# Patient Record
Sex: MALE | Race: WHITE | Employment: FULL TIME | URBAN - METROPOLITAN AREA
[De-identification: names, ages, dates, MRNs, and addresses within clinical notes are randomized per-mention and may not be internally consistent; named-entity substitution may affect disease eponyms.]

---

## 2018-03-12 ENCOUNTER — LAB ENCOUNTER (OUTPATIENT)
Dept: LAB | Age: 28
End: 2018-03-12
Attending: FAMILY MEDICINE
Payer: COMMERCIAL

## 2018-03-12 ENCOUNTER — OFFICE VISIT (OUTPATIENT)
Dept: FAMILY MEDICINE CLINIC | Facility: CLINIC | Age: 28
End: 2018-03-12

## 2018-03-12 VITALS
WEIGHT: 246 LBS | BODY MASS INDEX: 35.22 KG/M2 | HEART RATE: 80 BPM | SYSTOLIC BLOOD PRESSURE: 118 MMHG | TEMPERATURE: 98 F | HEIGHT: 70 IN | OXYGEN SATURATION: 98 % | DIASTOLIC BLOOD PRESSURE: 86 MMHG | RESPIRATION RATE: 16 BRPM

## 2018-03-12 DIAGNOSIS — Z00.00 ANNUAL PHYSICAL EXAM: ICD-10-CM

## 2018-03-12 DIAGNOSIS — Z00.00 ANNUAL PHYSICAL EXAM: Primary | ICD-10-CM

## 2018-03-12 LAB
25-HYDROXYVITAMIN D (TOTAL): 34.5 NG/ML (ref 30–100)
ALBUMIN SERPL-MCNC: 4.2 G/DL (ref 3.5–4.8)
ALP LIVER SERPL-CCNC: 127 U/L (ref 45–117)
ALT SERPL-CCNC: 38 U/L (ref 17–63)
AST SERPL-CCNC: 25 U/L (ref 15–41)
BASOPHILS # BLD AUTO: 0.06 X10(3) UL (ref 0–0.1)
BASOPHILS NFR BLD AUTO: 0.7 %
BILIRUB SERPL-MCNC: 0.4 MG/DL (ref 0.1–2)
BUN BLD-MCNC: 14 MG/DL (ref 8–20)
CALCIUM BLD-MCNC: 9.5 MG/DL (ref 8.3–10.3)
CHLORIDE: 106 MMOL/L (ref 101–111)
CHOLEST SMN-MCNC: 170 MG/DL (ref ?–200)
CO2: 26 MMOL/L (ref 22–32)
CREAT BLD-MCNC: 1.08 MG/DL (ref 0.7–1.3)
EOSINOPHIL # BLD AUTO: 0.13 X10(3) UL (ref 0–0.3)
EOSINOPHIL NFR BLD AUTO: 1.5 %
ERYTHROCYTE [DISTWIDTH] IN BLOOD BY AUTOMATED COUNT: 12.2 % (ref 11.5–16)
GLUCOSE BLD-MCNC: 93 MG/DL (ref 70–99)
HCT VFR BLD AUTO: 47.7 % (ref 37–53)
HDLC SERPL-MCNC: 32 MG/DL (ref 45–?)
HDLC SERPL: 5.31 {RATIO} (ref ?–4.97)
HGB BLD-MCNC: 15.7 G/DL (ref 13–17)
IMMATURE GRANULOCYTE COUNT: 0.02 X10(3) UL (ref 0–1)
IMMATURE GRANULOCYTE RATIO %: 0.2 %
LDLC SERPL CALC-MCNC: 125 MG/DL (ref ?–130)
LYMPHOCYTES # BLD AUTO: 2.15 X10(3) UL (ref 0.9–4)
LYMPHOCYTES NFR BLD AUTO: 24.4 %
M PROTEIN MFR SERPL ELPH: 8.3 G/DL (ref 6.1–8.3)
MCH RBC QN AUTO: 29.8 PG (ref 27–33.2)
MCHC RBC AUTO-ENTMCNC: 32.9 G/DL (ref 31–37)
MCV RBC AUTO: 90.5 FL (ref 80–99)
MONOCYTES # BLD AUTO: 0.59 X10(3) UL (ref 0.1–1)
MONOCYTES NFR BLD AUTO: 6.7 %
NEUTROPHIL ABS PRELIM: 5.85 X10 (3) UL (ref 1.3–6.7)
NEUTROPHILS # BLD AUTO: 5.85 X10(3) UL (ref 1.3–6.7)
NEUTROPHILS NFR BLD AUTO: 66.5 %
NONHDLC SERPL-MCNC: 138 MG/DL (ref ?–130)
PLATELET # BLD AUTO: 252 10(3)UL (ref 150–450)
POTASSIUM SERPL-SCNC: 4.6 MMOL/L (ref 3.6–5.1)
RBC # BLD AUTO: 5.27 X10(6)UL (ref 4.3–5.7)
RED CELL DISTRIBUTION WIDTH-SD: 40.4 FL (ref 35.1–46.3)
SODIUM SERPL-SCNC: 139 MMOL/L (ref 136–144)
TRIGL SERPL-MCNC: 65 MG/DL (ref ?–150)
TSI SER-ACNC: 2.39 MIU/ML (ref 0.35–5.5)
VLDLC SERPL CALC-MCNC: 13 MG/DL (ref 5–40)
WBC # BLD AUTO: 8.8 X10(3) UL (ref 4–13)

## 2018-03-12 PROCEDURE — 82306 VITAMIN D 25 HYDROXY: CPT | Performed by: FAMILY MEDICINE

## 2018-03-12 PROCEDURE — 36415 COLL VENOUS BLD VENIPUNCTURE: CPT | Performed by: FAMILY MEDICINE

## 2018-03-12 PROCEDURE — 80061 LIPID PANEL: CPT | Performed by: FAMILY MEDICINE

## 2018-03-12 PROCEDURE — 99395 PREV VISIT EST AGE 18-39: CPT | Performed by: FAMILY MEDICINE

## 2018-03-12 PROCEDURE — 80050 GENERAL HEALTH PANEL: CPT | Performed by: FAMILY MEDICINE

## 2018-03-12 RX ORDER — MULTIVIT-MIN/IRON FUM/FOLIC AC 7.5 MG-4
1 TABLET ORAL DAILY
COMMUNITY

## 2018-03-12 NOTE — H&P
Trinity Nguyen is a 32year old male who presents for a complete physical exam.   HPI:   Pt complains of nothing      Current Outpatient Prescriptions:  Multiple Vitamins-Minerals (MULTI-VITAMIN/MINERALS) Oral Tab Take 1 tablet by mouth daily.  Dis GENERAL: well developed, well nourished,in no apparent distress  SKIN: no rashes,no suspicious lesions  HEENT: atraumatic, normocephalic, PERRLA, conjunctiva clear, TMs clear, posterior pharynx clear, no congestion  NECK: supple,no adenopathy,no thyromeg

## 2018-07-27 ENCOUNTER — APPOINTMENT (OUTPATIENT)
Dept: LAB | Age: 28
End: 2018-07-27
Attending: PHYSICIAN ASSISTANT
Payer: COMMERCIAL

## 2018-07-27 ENCOUNTER — OFFICE VISIT (OUTPATIENT)
Dept: FAMILY MEDICINE CLINIC | Facility: CLINIC | Age: 28
End: 2018-07-27
Payer: COMMERCIAL

## 2018-07-27 VITALS
SYSTOLIC BLOOD PRESSURE: 108 MMHG | HEIGHT: 70 IN | WEIGHT: 242 LBS | TEMPERATURE: 98 F | HEART RATE: 78 BPM | BODY MASS INDEX: 34.65 KG/M2 | RESPIRATION RATE: 16 BRPM | OXYGEN SATURATION: 98 % | DIASTOLIC BLOOD PRESSURE: 70 MMHG

## 2018-07-27 DIAGNOSIS — R04.0 EPISTAXIS, RECURRENT: Primary | ICD-10-CM

## 2018-07-27 DIAGNOSIS — R04.0 EPISTAXIS, RECURRENT: ICD-10-CM

## 2018-07-27 LAB
ANION GAP SERPL CALC-SCNC: 9 MMOL/L (ref 0–18)
APTT PPP: 34.8 SECONDS (ref 26.1–34.6)
BUN BLD-MCNC: 15 MG/DL (ref 8–20)
BUN/CREAT SERPL: 13.9 (ref 10–20)
CALCIUM BLD-MCNC: 9.2 MG/DL (ref 8.3–10.3)
CHLORIDE SERPL-SCNC: 103 MMOL/L (ref 101–111)
CO2 SERPL-SCNC: 27 MMOL/L (ref 22–32)
CREAT BLD-MCNC: 1.08 MG/DL (ref 0.7–1.3)
GLUCOSE BLD-MCNC: 81 MG/DL (ref 70–99)
INR BLD: 0.96 (ref 0.9–1.1)
OSMOLALITY SERPL CALC.SUM OF ELEC: 288 MOSM/KG (ref 275–295)
POTASSIUM SERPL-SCNC: 4 MMOL/L (ref 3.6–5.1)
PSA SERPL DL<=0.01 NG/ML-MCNC: 13.2 SECONDS (ref 12.4–14.7)
SODIUM SERPL-SCNC: 139 MMOL/L (ref 136–144)

## 2018-07-27 PROCEDURE — 85246 CLOT FACTOR VIII VW ANTIGEN: CPT | Performed by: PHYSICIAN ASSISTANT

## 2018-07-27 PROCEDURE — 80048 BASIC METABOLIC PNL TOTAL CA: CPT | Performed by: PHYSICIAN ASSISTANT

## 2018-07-27 PROCEDURE — 36415 COLL VENOUS BLD VENIPUNCTURE: CPT | Performed by: PHYSICIAN ASSISTANT

## 2018-07-27 PROCEDURE — 85730 THROMBOPLASTIN TIME PARTIAL: CPT | Performed by: PHYSICIAN ASSISTANT

## 2018-07-27 PROCEDURE — 99213 OFFICE O/P EST LOW 20 MIN: CPT | Performed by: PHYSICIAN ASSISTANT

## 2018-07-27 PROCEDURE — 85610 PROTHROMBIN TIME: CPT | Performed by: PHYSICIAN ASSISTANT

## 2018-07-27 NOTE — PROGRESS NOTES
HPI:    Patient ID: Wade Regalado is a 32year old male. HPI  Pt presents to clinic with frequent nose bleeds from the right nostril. Has had symptoms off and on for the past 1 year. Last week had 3-4. Occurs when he blows his nose or sneezes. note and vitals reviewed. ASSESSMENT/PLAN:   1. Epistaxis, recurrent  Labs ordered  Apply small amount vaseline on Qtip to inside of nostril. Do not scratch or pick at inside of nose.    ENT consult due to duration of symptoms.   - PTT, ACTIVAT

## 2018-07-30 LAB — VON WILLEBRAND FACTOR ANTIGEN: 77 %

## 2020-08-14 ENCOUNTER — OFFICE VISIT (OUTPATIENT)
Dept: FAMILY MEDICINE CLINIC | Facility: CLINIC | Age: 30
End: 2020-08-14
Payer: COMMERCIAL

## 2020-08-14 ENCOUNTER — LAB ENCOUNTER (OUTPATIENT)
Dept: LAB | Age: 30
End: 2020-08-14
Attending: FAMILY MEDICINE
Payer: COMMERCIAL

## 2020-08-14 VITALS
OXYGEN SATURATION: 99 % | RESPIRATION RATE: 18 BRPM | WEIGHT: 185 LBS | SYSTOLIC BLOOD PRESSURE: 106 MMHG | BODY MASS INDEX: 26.48 KG/M2 | DIASTOLIC BLOOD PRESSURE: 70 MMHG | HEART RATE: 64 BPM | HEIGHT: 70 IN

## 2020-08-14 DIAGNOSIS — Z00.00 ANNUAL PHYSICAL EXAM: Primary | ICD-10-CM

## 2020-08-14 DIAGNOSIS — E55.9 VITAMIN D DEFICIENCY: ICD-10-CM

## 2020-08-14 DIAGNOSIS — Z00.00 ANNUAL PHYSICAL EXAM: ICD-10-CM

## 2020-08-14 LAB
ALBUMIN SERPL-MCNC: 4.1 G/DL (ref 3.4–5)
ALBUMIN/GLOB SERPL: 1.1 {RATIO} (ref 1–2)
ALP LIVER SERPL-CCNC: 78 U/L (ref 45–117)
ALT SERPL-CCNC: 19 U/L (ref 16–61)
ANION GAP SERPL CALC-SCNC: 6 MMOL/L (ref 0–18)
AST SERPL-CCNC: 15 U/L (ref 15–37)
BASOPHILS # BLD AUTO: 0.06 X10(3) UL (ref 0–0.2)
BASOPHILS NFR BLD AUTO: 1 %
BILIRUB SERPL-MCNC: 0.5 MG/DL (ref 0.1–2)
BUN BLD-MCNC: 13 MG/DL (ref 7–18)
BUN/CREAT SERPL: 15.5 (ref 10–20)
CALCIUM BLD-MCNC: 8.8 MG/DL (ref 8.5–10.1)
CHLORIDE SERPL-SCNC: 105 MMOL/L (ref 98–112)
CHOLEST SMN-MCNC: 190 MG/DL (ref ?–200)
CO2 SERPL-SCNC: 26 MMOL/L (ref 21–32)
CREAT BLD-MCNC: 0.84 MG/DL (ref 0.7–1.3)
DEPRECATED RDW RBC AUTO: 42.6 FL (ref 35.1–46.3)
EOSINOPHIL # BLD AUTO: 0.08 X10(3) UL (ref 0–0.7)
EOSINOPHIL NFR BLD AUTO: 1.3 %
ERYTHROCYTE [DISTWIDTH] IN BLOOD BY AUTOMATED COUNT: 12.4 % (ref 11–15)
GLOBULIN PLAS-MCNC: 3.7 G/DL (ref 2.8–4.4)
GLUCOSE BLD-MCNC: 80 MG/DL (ref 70–99)
HCT VFR BLD AUTO: 45.9 % (ref 39–53)
HDLC SERPL-MCNC: 41 MG/DL (ref 40–59)
HGB BLD-MCNC: 14.8 G/DL (ref 13–17.5)
IMM GRANULOCYTES # BLD AUTO: 0.01 X10(3) UL (ref 0–1)
IMM GRANULOCYTES NFR BLD: 0.2 %
LDLC SERPL CALC-MCNC: 135 MG/DL (ref ?–100)
LYMPHOCYTES # BLD AUTO: 1.97 X10(3) UL (ref 1–4)
LYMPHOCYTES NFR BLD AUTO: 32.9 %
M PROTEIN MFR SERPL ELPH: 7.8 G/DL (ref 6.4–8.2)
MCH RBC QN AUTO: 30.4 PG (ref 26–34)
MCHC RBC AUTO-ENTMCNC: 32.2 G/DL (ref 31–37)
MCV RBC AUTO: 94.3 FL (ref 80–100)
MONOCYTES # BLD AUTO: 0.51 X10(3) UL (ref 0.1–1)
MONOCYTES NFR BLD AUTO: 8.5 %
NEUTROPHILS # BLD AUTO: 3.35 X10 (3) UL (ref 1.5–7.7)
NEUTROPHILS # BLD AUTO: 3.35 X10(3) UL (ref 1.5–7.7)
NEUTROPHILS NFR BLD AUTO: 56.1 %
NONHDLC SERPL-MCNC: 149 MG/DL (ref ?–130)
OSMOLALITY SERPL CALC.SUM OF ELEC: 283 MOSM/KG (ref 275–295)
PATIENT FASTING Y/N/NP: YES
PATIENT FASTING Y/N/NP: YES
PLATELET # BLD AUTO: 218 10(3)UL (ref 150–450)
POTASSIUM SERPL-SCNC: 4 MMOL/L (ref 3.5–5.1)
RBC # BLD AUTO: 4.87 X10(6)UL (ref 4.3–5.7)
SODIUM SERPL-SCNC: 137 MMOL/L (ref 136–145)
TRIGL SERPL-MCNC: 72 MG/DL (ref 30–149)
TSI SER-ACNC: 1.77 MIU/ML (ref 0.36–3.74)
VIT D+METAB SERPL-MCNC: 26 NG/ML (ref 30–100)
VLDLC SERPL CALC-MCNC: 14 MG/DL (ref 0–30)
WBC # BLD AUTO: 6 X10(3) UL (ref 4–11)

## 2020-08-14 PROCEDURE — 80061 LIPID PANEL: CPT | Performed by: FAMILY MEDICINE

## 2020-08-14 PROCEDURE — 3074F SYST BP LT 130 MM HG: CPT | Performed by: FAMILY MEDICINE

## 2020-08-14 PROCEDURE — 3078F DIAST BP <80 MM HG: CPT | Performed by: FAMILY MEDICINE

## 2020-08-14 PROCEDURE — 80050 GENERAL HEALTH PANEL: CPT | Performed by: FAMILY MEDICINE

## 2020-08-14 PROCEDURE — 82306 VITAMIN D 25 HYDROXY: CPT | Performed by: FAMILY MEDICINE

## 2020-08-14 PROCEDURE — 99395 PREV VISIT EST AGE 18-39: CPT | Performed by: FAMILY MEDICINE

## 2020-08-14 PROCEDURE — 36415 COLL VENOUS BLD VENIPUNCTURE: CPT | Performed by: FAMILY MEDICINE

## 2020-08-14 PROCEDURE — 3008F BODY MASS INDEX DOCD: CPT | Performed by: FAMILY MEDICINE

## 2020-08-15 NOTE — H&P
Suzzanne Leventhal is a 34year old male who presents for a complete physical exam.   HPI:   Pt complains of nothing. .       Current Outpatient Medications   Medication Sig Dispense Refill   • Multiple Vitamins-Minerals (MULTI-VITAMIN/MINERALS) Oral T suspicious lesions  HEENT: atraumatic, normocephalic, PERRLA, conjunctiva clear, TMs clear, posterior pharynx clear, no congestion  NECK: supple,no adenopathy,no thyromegaly  LUNGS: clear to auscultation, easy breathing  CV: S1S2, RRR without murmur  GI: g

## 2021-04-14 ENCOUNTER — APPOINTMENT (OUTPATIENT)
Dept: LAB | Age: 31
End: 2021-04-14

## 2021-04-17 ENCOUNTER — IMMUNIZATION (OUTPATIENT)
Dept: LAB | Age: 31
End: 2021-04-17
Attending: HOSPITALIST
Payer: OTHER GOVERNMENT

## 2021-04-17 DIAGNOSIS — Z23 NEED FOR VACCINATION: Primary | ICD-10-CM

## 2021-04-17 PROCEDURE — 0001A SARSCOV2 VAC 30MCG/0.3ML IM: CPT

## 2021-05-09 ENCOUNTER — IMMUNIZATION (OUTPATIENT)
Dept: LAB | Age: 31
End: 2021-05-09
Attending: HOSPITALIST
Payer: OTHER GOVERNMENT

## 2021-05-09 DIAGNOSIS — Z23 NEED FOR VACCINATION: Primary | ICD-10-CM

## 2021-05-09 PROCEDURE — 0002A SARSCOV2 VAC 30MCG/0.3ML IM: CPT

## 2021-12-20 ENCOUNTER — OFFICE VISIT (OUTPATIENT)
Dept: FAMILY MEDICINE CLINIC | Facility: CLINIC | Age: 31
End: 2021-12-20
Payer: COMMERCIAL

## 2021-12-20 VITALS
SYSTOLIC BLOOD PRESSURE: 118 MMHG | OXYGEN SATURATION: 99 % | BODY MASS INDEX: 27 KG/M2 | HEART RATE: 130 BPM | HEIGHT: 70 IN | DIASTOLIC BLOOD PRESSURE: 76 MMHG | TEMPERATURE: 98 F | RESPIRATION RATE: 16 BRPM

## 2021-12-20 DIAGNOSIS — J06.9 UPPER RESPIRATORY TRACT INFECTION, UNSPECIFIED TYPE: Primary | ICD-10-CM

## 2021-12-20 DIAGNOSIS — J02.9 SORE THROAT: ICD-10-CM

## 2021-12-20 PROCEDURE — 3078F DIAST BP <80 MM HG: CPT | Performed by: NURSE PRACTITIONER

## 2021-12-20 PROCEDURE — 87880 STREP A ASSAY W/OPTIC: CPT | Performed by: NURSE PRACTITIONER

## 2021-12-20 PROCEDURE — 99213 OFFICE O/P EST LOW 20 MIN: CPT | Performed by: NURSE PRACTITIONER

## 2021-12-20 PROCEDURE — 3074F SYST BP LT 130 MM HG: CPT | Performed by: NURSE PRACTITIONER

## 2021-12-21 NOTE — PROGRESS NOTES
CHIEF COMPLAINT:   Patient presents with:  Upper Respiratory Infection: x 4 days      HPI:   Amira Steven is a 32year old male who presents for upper respiratory symptoms for  4 days. Patient reports sore throat, congestion, dry cough.  Symptoms Jamaal Emery is a 32year old male who presents with upper respiratory symptoms that are consistent with    ASSESSMENT:   Upper respiratory tract infection, unspecified type  (primary encounter diagnosis)  Sore throat    PLAN: Meds as below.   Comfort care as suellen over-the-counter acetaminophen or ibuprofen for fever, muscle aching, and headache, unless another medicine was prescribed for this.  If you have chronic liver or kidney disease or ever had a stomach ulcer or gastrointestinal bleeding, talk with your health (red or black color) or mucus in diarrhea  · Feeling weak, dizzy, or like you are going to faint  · Extreme thirst  · Fever of 100.4°F (38°C) or higher, or as directed by your healthcare provider  Yakov last reviewed this educational content on 4/1/2018

## 2024-01-18 ENCOUNTER — OFFICE VISIT (OUTPATIENT)
Dept: FAMILY MEDICINE CLINIC | Facility: CLINIC | Age: 34
End: 2024-01-18
Payer: COMMERCIAL

## 2024-01-18 VITALS
OXYGEN SATURATION: 97 % | SYSTOLIC BLOOD PRESSURE: 154 MMHG | HEART RATE: 109 BPM | DIASTOLIC BLOOD PRESSURE: 100 MMHG | RESPIRATION RATE: 18 BRPM | TEMPERATURE: 98 F

## 2024-01-18 DIAGNOSIS — J02.9 SORE THROAT: Primary | ICD-10-CM

## 2024-01-18 DIAGNOSIS — J06.9 VIRAL UPPER RESPIRATORY ILLNESS: ICD-10-CM

## 2024-01-18 DIAGNOSIS — R03.0 ELEVATED BLOOD PRESSURE READING: ICD-10-CM

## 2024-01-18 LAB
CONTROL LINE PRESENT WITH A CLEAR BACKGROUND (YES/NO): YES YES/NO
KIT LOT #: NORMAL NUMERIC
STREP GRP A CUL-SCR: NEGATIVE

## 2024-01-18 PROCEDURE — 87880 STREP A ASSAY W/OPTIC: CPT | Performed by: NURSE PRACTITIONER

## 2024-01-18 PROCEDURE — 87635 SARS-COV-2 COVID-19 AMP PRB: CPT | Performed by: NURSE PRACTITIONER

## 2024-01-18 PROCEDURE — 3077F SYST BP >= 140 MM HG: CPT | Performed by: NURSE PRACTITIONER

## 2024-01-18 PROCEDURE — 3080F DIAST BP >= 90 MM HG: CPT | Performed by: NURSE PRACTITIONER

## 2024-01-18 PROCEDURE — 99213 OFFICE O/P EST LOW 20 MIN: CPT | Performed by: NURSE PRACTITIONER

## 2024-01-18 NOTE — PROGRESS NOTES
CHIEF COMPLAINT:   Sore throat        HPI:   James Cooper is a 33 year old male presents to clinic with complaint of sore throat. Patient has had for 2 days. Patient reports following associated symptoms: fatigue and cough.    Denies fever, chills, rash, nausea, headache, ear pain.  Treating symptoms with none.    Current Outpatient Medications   Medication Sig Dispense Refill    Multiple Vitamins-Minerals (MULTI-VITAMIN/MINERALS) Oral Tab Take 1 tablet by mouth daily.      Fish Oil-Cholecalciferol (FISH OIL + D3 OR) Take by mouth.        Past Medical History:   Diagnosis Date    Other general medical examination for administrative purposes       Social History:  Social History     Socioeconomic History    Marital status: Single   Tobacco Use    Smoking status: Never    Smokeless tobacco: Never   Substance and Sexual Activity    Alcohol use: Yes    Drug use: No   Other Topics Concern    Caffeine Concern Yes    Exercise Yes        REVIEW OF SYSTEMS:   GENERAL HEALTH: feels well otherwise, good appetite  SKIN: denies any unusual skin lesions or rashes  HEENT: denies ear pain, See HPI  RESPIRATORY: denies shortness of breath or wheezing  CARDIOVASCULAR: denies chest pain or palpitations   GI: denies vomiting or diarrhea  NEURO: denies dizziness or lightheadedness    EXAM:   BP (!) 154/100   Pulse 109   Temp 98.3 °F (36.8 °C)   Resp 18   SpO2 97%   GENERAL: well developed, well nourished,in no apparent distress  SKIN: no rashes,no suspicious lesions  HEAD: atraumatic, normocephalic  EYES: conjunctiva clear, EOM intact  EARS: TM's clear, non-injected, no bulging, retraction, or fluid bilaterally  NOSE: nostrils patent, no exudates, nasal mucosa pink and noninflamed  THROAT: oral mucosa pink, moist. Posterior pharynx erythematous and injected. No exudates. Tonsils 1/4.  Breath is not malodorous.  No trismus, hoarseness, muffled voice, stridor, or uvular deviation.    NECK: supple, non-tender  LUNGS:  clear to auscultation bilaterally; no wheezes, rales, or rhonchi. Breathing is non labored.  CARDIO: RRR without murmur  EXTREMITIES: no cyanosis, clubbing or edema  LYMPH: bilateral anterior cervical lymphadenopathy. No  posterior cervical or occipital lymphadenopathy.    Recent Results (from the past 24 hour(s))   Strep A Assay W/Optic    Collection Time: 01/18/24 12:30 PM   Result Value Ref Range    Strep Grp A Screen negative Negative    Control Line Present with a clear background (yes/no) yes Yes/No    Kit Lot # [id number] Numeric    Kit Expiration Date 4/22/25 Date         ASSESSMENT AND PLAN:   Assessment: 1.   Encounter Diagnoses   Name Primary?    Sore throat Yes    Viral upper respiratory illness     Elevated blood pressure reading      Rapid strep screen is negative   1. Sore throat  negative  - Strep A Assay W/Optic    2. Viral upper respiratory illness  Supportive care  - SARS-CoV-2 by PCR; Future  - SARS-CoV-2 by PCR    3. Elevated blood pressure reading  BP diary at home. F/u with PCP    Plan: Discussed that due to symptoms and negative rapid strep this is most likely viral and does not require antibiotics.   Comfort measures explained and discussed as listed in Patient Instructions  Follow up with PCP in 7-10 days if not improving, condition worsens, or fever greater than or equal to 100.4 persists for 72 hours.      See pt handout    The patient/parent indicates understanding of these issues and agrees to the plan.

## 2024-01-19 LAB — SARS-COV-2 RNA RESP QL NAA+PROBE: NOT DETECTED

## 2024-08-04 ENCOUNTER — OFFICE VISIT (OUTPATIENT)
Dept: FAMILY MEDICINE CLINIC | Facility: CLINIC | Age: 34
End: 2024-08-04
Payer: COMMERCIAL

## 2024-08-04 VITALS
RESPIRATION RATE: 18 BRPM | SYSTOLIC BLOOD PRESSURE: 152 MMHG | WEIGHT: 280 LBS | DIASTOLIC BLOOD PRESSURE: 90 MMHG | BODY MASS INDEX: 40.09 KG/M2 | HEART RATE: 104 BPM | OXYGEN SATURATION: 97 % | HEIGHT: 70 IN | TEMPERATURE: 99 F

## 2024-08-04 DIAGNOSIS — L03.115 CELLULITIS OF RIGHT LEG: Primary | ICD-10-CM

## 2024-08-04 RX ORDER — CLINDAMYCIN HYDROCHLORIDE 300 MG/1
300 CAPSULE ORAL 3 TIMES DAILY
Qty: 21 CAPSULE | Refills: 0 | Status: SHIPPED | OUTPATIENT
Start: 2024-08-04 | End: 2024-08-11

## 2024-08-04 NOTE — PROGRESS NOTES
James Cooper is a 33 year old male.    S:  Patient presents today with the following concerns:  Chief Complaint   Patient presents with    Bite Sting,Insect     Mosquito bite on right lower leg. For a week ( redness and soreness)   OTC: OZONOL ointment     Patient had bug bite on the right lower leg.  Itchy.  Then 3 days ago used Ozonol ointment and the next morning the area was more red and larger.    Denies fevers, chills, joint pains.  Feels well otherwise.    No hx of DM or HTN.      Current Outpatient Medications   Medication Sig Dispense Refill    clindamycin 300 MG Oral Cap Take 1 capsule (300 mg total) by mouth 3 (three) times daily for 7 days. 21 capsule 0    Multiple Vitamins-Minerals (MULTI-VITAMIN/MINERALS) Oral Tab Take 1 tablet by mouth daily.      Fish Oil-Cholecalciferol (FISH OIL + D3 OR) Take by mouth.       Patient Active Problem List   Diagnosis    Attention deficit disorder without mention of hyperactivity    Obesity, unspecified    Other acne     Family History   Problem Relation Age of Onset    Other (intolerance to wheat gluten [Other]) Paternal Grandmother     Other (leukemia [Other]) Maternal Grandmother     Other (lung cancer [Other]) Paternal Grandfather        REVIEW OF SYSTEMS:  GENERAL: feels well otherwise  SKIN: see above  EYES:denies vision change  LUNGS: denies shortness of breath with exertion  CARDIOVASCULAR: denies chest pain on exertion  GI: denies abdominal pain.  No N/V/D/C  : denies dysuria  MUSCULOSKELETAL: denies back pain  NEURO: denies headaches    EXAM:  /90   Pulse 104   Temp 98.7 °F (37.1 °C) (Temporal)   Resp 18   Ht 5' 10\" (1.778 m)   Wt 280 lb (127 kg)   SpO2 97%   BMI 40.18 kg/m²   Physical Exam  Constitutional:       General: He is not in acute distress.     Appearance: Normal appearance. He is not ill-appearing, toxic-appearing or diaphoretic.   HENT:      Head: Normocephalic and atraumatic.   Cardiovascular:      Pulses: Normal pulses.    Pulmonary:      Effort: Pulmonary effort is normal.   Skin:     General: Skin is warm and dry.      Comments: Right medial distal leg with 4 cm by 4 cm  square shaped area of erythema and warmth.  Tender to palpation.  Some swelling distal to this.  No streaking.  Area is not circumferential.    Border marked with skin pen.   Neurological:      Mental Status: He is alert.        ASSESSMENT AND PLAN:  James Cooper is a 33 year old male.  Encounter Diagnosis   Name Primary?    Cellulitis of right leg Yes       No results found.     No orders of the defined types were placed in this encounter.    Meds & Refills for this Visit:  Requested Prescriptions     Signed Prescriptions Disp Refills    clindamycin 300 MG Oral Cap 21 capsule 0     Sig: Take 1 capsule (300 mg total) by mouth 3 (three) times daily for 7 days.     Imaging & Consults:  None    This appears to be cellulitis vs allergic reaction to the OTC ointment. He has stopped using this.  Recommend clindamycin as above.  Cool compresses to area.  NO icing  Gentle skin care.  Monitor marked area for progressive infection.  If develops fevers, red streaks, increased swelling/redness, body aches, weakness to go to ED.    Follow up with PCP over the next few weeks regarding elevated BP.  Message sent to PCP.    Patient verbalizes understanding of plan.    No follow-ups on file.

## 2024-08-20 ENCOUNTER — OFFICE VISIT (OUTPATIENT)
Dept: FAMILY MEDICINE CLINIC | Facility: CLINIC | Age: 34
End: 2024-08-20
Payer: COMMERCIAL

## 2024-08-20 VITALS
WEIGHT: 290 LBS | HEART RATE: 90 BPM | TEMPERATURE: 98 F | DIASTOLIC BLOOD PRESSURE: 92 MMHG | BODY MASS INDEX: 41.52 KG/M2 | HEIGHT: 70 IN | OXYGEN SATURATION: 98 % | SYSTOLIC BLOOD PRESSURE: 140 MMHG | RESPIRATION RATE: 17 BRPM

## 2024-08-20 DIAGNOSIS — L03.115 CELLULITIS OF RIGHT LEG: Primary | ICD-10-CM

## 2024-08-20 PROCEDURE — 3077F SYST BP >= 140 MM HG: CPT | Performed by: FAMILY MEDICINE

## 2024-08-20 PROCEDURE — 3080F DIAST BP >= 90 MM HG: CPT | Performed by: FAMILY MEDICINE

## 2024-08-20 PROCEDURE — 3008F BODY MASS INDEX DOCD: CPT | Performed by: FAMILY MEDICINE

## 2024-08-20 PROCEDURE — 99203 OFFICE O/P NEW LOW 30 MIN: CPT | Performed by: FAMILY MEDICINE

## 2024-08-20 RX ORDER — SULFAMETHOXAZOLE/TRIMETHOPRIM 800-160 MG
1 TABLET ORAL 2 TIMES DAILY
Qty: 14 TABLET | Refills: 0 | Status: SHIPPED | OUTPATIENT
Start: 2024-08-20

## 2024-08-20 NOTE — PROGRESS NOTES
[facility] Progress Note    SUBJECTIVE: James Cooper 33 year old male is here today for   Chief Complaint   Patient presents with    Urgent Care F/u     Cellulitis of the right leg-abx finished but infection ongoing-says he does not heal quickly        James had a bug bite, put a lotion on it and got redder and seen in UC and was put on abx course for one week. Does seem like it reduced in size of the redness    PMH  Past Medical History:    Other general medical examination for administrative purposes        PSH  No past surgical history on file.     Social Hx:  No major changes.    ROS  See HPI    OBJECTIVE:  BP (!) 140/92   Pulse 90   Temp 97.8 °F (36.6 °C)   Resp 17   Ht 5' 10\" (1.778 m)   Wt 290 lb (131.5 kg)   SpO2 98%   BMI 41.61 kg/m²     Exam  Skin: still red, warm, swollen      Labs:          Meds:   Current Outpatient Medications   Medication Sig Dispense Refill    sulfamethoxazole-trimethoprim DS (BACTRIM DS) 800-160 MG Oral Tab per tablet Take 1 tablet by mouth 2 (two) times daily. 14 tablet 0    Multiple Vitamins-Minerals (MULTI-VITAMIN/MINERALS) Oral Tab Take 1 tablet by mouth daily.      Fish Oil-Cholecalciferol (FISH OIL + D3 OR) Take by mouth.           Assessment/Plan  James was seen today for urgent care f/u.    Diagnoses and all orders for this visit:    Cellulitis of right leg  -     sulfamethoxazole-trimethoprim DS (BACTRIM DS) 800-160 MG Oral Tab per tablet; Take 1 tablet by mouth 2 (two) times daily.         Will add another week of abx to follow up or send photos if resolving         Total Time spent with patient and coordinating care:  25 minutes.    Follow up: as needed, consider 1 month for BP recheck      José Pfeiffer MD

## 2024-11-20 ENCOUNTER — OFFICE VISIT (OUTPATIENT)
Dept: FAMILY MEDICINE CLINIC | Facility: CLINIC | Age: 34
End: 2024-11-20
Payer: COMMERCIAL

## 2024-11-20 VITALS
BODY MASS INDEX: 42.66 KG/M2 | OXYGEN SATURATION: 98 % | RESPIRATION RATE: 18 BRPM | HEIGHT: 70 IN | DIASTOLIC BLOOD PRESSURE: 88 MMHG | HEART RATE: 105 BPM | WEIGHT: 298 LBS | SYSTOLIC BLOOD PRESSURE: 136 MMHG

## 2024-11-20 DIAGNOSIS — Z00.00 WELLNESS EXAMINATION: Primary | ICD-10-CM

## 2024-11-20 DIAGNOSIS — T14.8XXA CHRONIC WOUND: ICD-10-CM

## 2024-11-20 DIAGNOSIS — Z13.0 SCREENING FOR DEFICIENCY ANEMIA: ICD-10-CM

## 2024-11-20 DIAGNOSIS — Z13.220 LIPID SCREENING: ICD-10-CM

## 2024-11-20 DIAGNOSIS — Z13.29 THYROID DISORDER SCREEN: ICD-10-CM

## 2024-11-20 PROCEDURE — 3008F BODY MASS INDEX DOCD: CPT | Performed by: FAMILY MEDICINE

## 2024-11-20 PROCEDURE — 3079F DIAST BP 80-89 MM HG: CPT | Performed by: FAMILY MEDICINE

## 2024-11-20 PROCEDURE — 99395 PREV VISIT EST AGE 18-39: CPT | Performed by: FAMILY MEDICINE

## 2024-11-20 PROCEDURE — 3075F SYST BP GE 130 - 139MM HG: CPT | Performed by: FAMILY MEDICINE

## 2024-11-20 NOTE — PROGRESS NOTES
HPI:   James Cooper is a 34 year old male who presents for an Annual Health Visit.     Has ongoing wound from this summer    Social:  Living on his own, condo.  Work; , taks care office issues, paperwork checking  Hobbies: plays d&d, online garret, painting miniatures.    Allergies:   No Known Allergies    CURRENT MEDICATIONS   No current outpatient medications on file.        HISTORICAL INFORMATION   Past Medical History:    Other general medical examination for administrative purposes      No past surgical history on file.   Family History   Problem Relation Age of Onset    Other (intolerance to wheat gluten [Other]) Paternal Grandmother     Other (leukemia [Other]) Maternal Grandmother     Other (lung cancer [Other]) Paternal Grandfather       SOCIAL HISTORY   Social History     Socioeconomic History    Marital status: Single   Tobacco Use    Smoking status: Never    Smokeless tobacco: Never   Substance and Sexual Activity    Alcohol use: Yes    Drug use: No   Other Topics Concern    Caffeine Concern Yes    Exercise Yes     Social History     Social History Narrative    Not on file        REVIEW OF SYSTEMS:     Constitutional: negative  Eyes: negative  ENT: negative  Respiratory: negative  Cardiovascular: negative  Gastrointestinal: negative  Integument/Breast:  see HPI  Genitourinary: negative  Heme/Lymph: negative  Musculoskeletal: negative  Neurological: negative  Psych: negative  Endocrine: negative  Allergic/Immune: negative    EXAM:   /88   Pulse 105   Resp 18   Ht 5' 10\" (1.778 m)   Wt 298 lb (135.2 kg)   SpO2 98%   BMI 42.76 kg/m²    Wt Readings from Last 6 Encounters:   11/20/24 298 lb (135.2 kg)   08/20/24 290 lb (131.5 kg)   08/04/24 280 lb (127 kg)   08/14/20 185 lb (83.9 kg)   07/27/18 242 lb (109.8 kg)   03/12/18 246 lb (111.6 kg)     Body mass index is 42.76 kg/m².    General: alert, appears stated age, and cooperative  Head: Normocephalic, without  obvious abnormality, atraumatic  Eyes: conjunctivae/corneas clear. PERRL, EOM's intact. Fundi benign.  Ears: normal TM's and external ear canals both ears  Nose: Nares normal. Septum midline. Mucosa normal. No drainage or sinus tenderness.  Throat: lips, mucosa, and tongue normal; teeth and gums normal  Neck: no adenopathy, no carotid bruit, no JVD, supple, symmetrical, trachea midline, and thyroid not enlarged, symmetric, no tenderness/mass/nodules  Heart: S1, S2 normal, no murmur, click, rub or gallop, regular rate and rhythm  Lungs: clear to auscultation bilaterally  Chest wall: no tenderness  Abdomen: soft, non-tender; bowel sounds normal; no masses,  no organomegaly  : deferred  Back: symmetric, no curvature. ROM normal. No CVA tenderness.  Extremities: extremities normal, atraumatic, no cyanosis or edema  Pulses: 2+ and symmetric  Skin: right lower extremity, open lesio nwith eschar, no drainage about 1 cm diameter  Lymph Nodes: Cervical, supraclavicular, and axillary nodes normal.  Neurologic: Grossly normal    ASSESSMENT AND PLAN:   James was seen today for physical.    Diagnoses and all orders for this visit:    Wellness examination  -     Comp Metabolic Panel (14); Future  -     CBC With Differential With Platelet; Future  -     Lipid Panel; Future  -     TSH W Reflex To Free T4; Future    Lipid screening  -     Lipid Panel; Future    Screening for deficiency anemia  -     CBC With Differential With Platelet; Future    Thyroid disorder screen  -     TSH W Reflex To Free T4; Future    Chronic wound  -     OP REFERRAL - WOUND CLINIC    Other orders  -     Fluzone trivalent vaccine, PF 0.5mL, 6mo+ (87924)      Will recommend seeing wound clinic to evaluate further as not healing from injury and infection this summer    Will get screening labs    Advised on dietary and exercise goals  There are no Patient Instructions on file for this visit.    The patient indicates understanding of these issues and  agrees to the plan.    Problem List:  Patient Active Problem List   Diagnosis    Attention deficit disorder without mention of hyperactivity    Obesity, unspecified    Other acne       José Pfeiffer MD  11/20/2024  4:06 PM

## 2024-11-26 ENCOUNTER — TELEPHONE (OUTPATIENT)
Dept: WOUND CARE | Facility: HOSPITAL | Age: 34
End: 2024-11-26

## 2024-11-26 ENCOUNTER — PATIENT MESSAGE (OUTPATIENT)
Dept: FAMILY MEDICINE CLINIC | Facility: CLINIC | Age: 34
End: 2024-11-26

## 2024-11-26 NOTE — TELEPHONE ENCOUNTER
States his wound is healed and scab over for 3 months now, but the wound left a hole the size of a finger. Adv we can't see him caused it's not an opened wound.

## 2024-11-29 ENCOUNTER — LAB ENCOUNTER (OUTPATIENT)
Dept: LAB | Age: 34
End: 2024-11-29
Attending: FAMILY MEDICINE
Payer: COMMERCIAL

## 2024-11-29 DIAGNOSIS — Z13.220 LIPID SCREENING: ICD-10-CM

## 2024-11-29 DIAGNOSIS — Z13.29 THYROID DISORDER SCREEN: ICD-10-CM

## 2024-11-29 DIAGNOSIS — Z13.0 SCREENING FOR DEFICIENCY ANEMIA: ICD-10-CM

## 2024-11-29 DIAGNOSIS — Z00.00 WELLNESS EXAMINATION: ICD-10-CM

## 2024-11-29 LAB
ALBUMIN SERPL-MCNC: 5.2 G/DL (ref 3.2–4.8)
ALBUMIN/GLOB SERPL: 1.7 {RATIO} (ref 1–2)
ALP LIVER SERPL-CCNC: 96 U/L
ALT SERPL-CCNC: 94 U/L
ANION GAP SERPL CALC-SCNC: 10 MMOL/L (ref 0–18)
AST SERPL-CCNC: 51 U/L (ref ?–34)
BASOPHILS # BLD AUTO: 0.09 X10(3) UL (ref 0–0.2)
BASOPHILS NFR BLD AUTO: 1.2 %
BILIRUB SERPL-MCNC: 0.4 MG/DL (ref 0.3–1.2)
BUN BLD-MCNC: 15 MG/DL (ref 9–23)
CALCIUM BLD-MCNC: 9.9 MG/DL (ref 8.7–10.4)
CHLORIDE SERPL-SCNC: 105 MMOL/L (ref 98–112)
CHOLEST SERPL-MCNC: 233 MG/DL (ref ?–200)
CO2 SERPL-SCNC: 25 MMOL/L (ref 21–32)
CREAT BLD-MCNC: 1.26 MG/DL
EGFRCR SERPLBLD CKD-EPI 2021: 77 ML/MIN/1.73M2 (ref 60–?)
EOSINOPHIL # BLD AUTO: 0.13 X10(3) UL (ref 0–0.7)
EOSINOPHIL NFR BLD AUTO: 1.7 %
ERYTHROCYTE [DISTWIDTH] IN BLOOD BY AUTOMATED COUNT: 12.7 %
FASTING PATIENT LIPID ANSWER: YES
FASTING STATUS PATIENT QL REPORTED: YES
GLOBULIN PLAS-MCNC: 3.1 G/DL (ref 2–3.5)
GLUCOSE BLD-MCNC: 87 MG/DL (ref 70–99)
HCT VFR BLD AUTO: 47.3 %
HDLC SERPL-MCNC: 38 MG/DL (ref 40–59)
HGB BLD-MCNC: 15.7 G/DL
IMM GRANULOCYTES # BLD AUTO: 0.02 X10(3) UL (ref 0–1)
IMM GRANULOCYTES NFR BLD: 0.3 %
LDLC SERPL CALC-MCNC: 166 MG/DL (ref ?–100)
LYMPHOCYTES # BLD AUTO: 2.67 X10(3) UL (ref 1–4)
LYMPHOCYTES NFR BLD AUTO: 34.1 %
MCH RBC QN AUTO: 30.1 PG (ref 26–34)
MCHC RBC AUTO-ENTMCNC: 33.2 G/DL (ref 31–37)
MCV RBC AUTO: 90.8 FL
MONOCYTES # BLD AUTO: 0.63 X10(3) UL (ref 0.1–1)
MONOCYTES NFR BLD AUTO: 8.1 %
NEUTROPHILS # BLD AUTO: 4.28 X10 (3) UL (ref 1.5–7.7)
NEUTROPHILS # BLD AUTO: 4.28 X10(3) UL (ref 1.5–7.7)
NEUTROPHILS NFR BLD AUTO: 54.6 %
NONHDLC SERPL-MCNC: 195 MG/DL (ref ?–130)
OSMOLALITY SERPL CALC.SUM OF ELEC: 290 MOSM/KG (ref 275–295)
PLATELET # BLD AUTO: 290 10(3)UL (ref 150–450)
POTASSIUM SERPL-SCNC: 4.9 MMOL/L (ref 3.5–5.1)
PROT SERPL-MCNC: 8.3 G/DL (ref 5.7–8.2)
RBC # BLD AUTO: 5.21 X10(6)UL
SODIUM SERPL-SCNC: 140 MMOL/L (ref 136–145)
TRIGL SERPL-MCNC: 157 MG/DL (ref 30–149)
TSI SER-ACNC: 2.55 UIU/ML (ref 0.55–4.78)
VLDLC SERPL CALC-MCNC: 31 MG/DL (ref 0–30)
WBC # BLD AUTO: 7.8 X10(3) UL (ref 4–11)

## 2024-11-29 PROCEDURE — 80050 GENERAL HEALTH PANEL: CPT | Performed by: FAMILY MEDICINE

## 2024-11-29 PROCEDURE — 80061 LIPID PANEL: CPT | Performed by: FAMILY MEDICINE

## 2024-11-29 NOTE — TELEPHONE ENCOUNTER
It is an open wound, I think his description is under selling it, it just isn't bleeding. Could go in person, but it meets appropriate criteria to be seen. I fthey won't accept it then I would recommend derm amanda Pfeiffer MD

## 2024-12-02 NOTE — TELEPHONE ENCOUNTER
I spoke with patient and he states the [facility] called him back and he has appointment scheduled 12/3/24

## 2024-12-03 ENCOUNTER — OFFICE VISIT (OUTPATIENT)
Dept: WOUND CARE | Facility: HOSPITAL | Age: 34
End: 2024-12-03
Attending: NURSE PRACTITIONER
Payer: COMMERCIAL

## 2024-12-03 VITALS
WEIGHT: 290 LBS | HEIGHT: 70 IN | HEART RATE: 87 BPM | SYSTOLIC BLOOD PRESSURE: 168 MMHG | BODY MASS INDEX: 41.52 KG/M2 | RESPIRATION RATE: 14 BRPM | TEMPERATURE: 98 F | DIASTOLIC BLOOD PRESSURE: 88 MMHG

## 2024-12-03 DIAGNOSIS — L97.812 ULCER OF RIGHT PRETIBIAL REGION WITH FAT LAYER EXPOSED (HCC): Primary | ICD-10-CM
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
## 2024-12-03 DIAGNOSIS — I87.331 VENOUS HYPERTENSION, CHRONIC, WITH ULCER AND INFLAMMATION, RIGHT (HCC): ICD-10-CM

## 2024-12-03 PROCEDURE — 99215 OFFICE O/P EST HI 40 MIN: CPT | Performed by: NURSE PRACTITIONER

## 2024-12-03 NOTE — PROGRESS NOTES
CHIEF COMPLAINT:     Chief Complaint   Patient presents with    Wound Care     Arrives for initial visit. Patient got bug bite that opened up a couple months ago. Has not closed. States he has gone through 2 courses of antibiotics due to wound getting infected.     HPI:   Information obtained from patient and chart  12-3-24 INITIAL:  patient is a 35 yo male with pmhx of adhd, htn and obesity.  In August he presented to urgent care with an area of cellulitis on his right lower leg. He states that he is certain it was a bug bite as he was outside, felt it and then the area started to swell.  At urgent care it was not clear if it was true cellulitis or a reaction to an ointment he had been using on the area.  He was treated with clindamycin followed by a week of bactrim.. when he followed up with pcp recently for his yearly physical it was noted that the eschar was still present and patient was referred to wound clinic. Noted during visits in the summer and recently patient with elevated bp-untreated at this time. We discussed monitoring at home and keeping a log and risks of consistently high bp to other organ systems.  He has been treating the area with an ointment from [facility] that his parents recommended.  Today there is not any s/s of infection. 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 He does sleep in a bed and denies any other medical hx.  No c/o pain. We discussed compression, patient is agreeable to treatment plan.      MEDICATIONS:   No current outpatient medications on file.  ALLERGIES:   Allergies[1]   REVIEW OF SYSTEMS:   This information was obtained from the patient/family and chart.    See HPI for pertinent positives, otherwise 10 pt ROS negative.    HISTORY:     Past Medical History:    Attention deficit disorder (ADD) without hyperactivity    Hypertension    Obesity    Other general medical examination for administrative purposes     History reviewed. No pertinent surgical history.    Social History     Socioeconomic History    Marital status: Single   Tobacco Use    Smoking status: Never    Smokeless tobacco: Never   Substance and Sexual Activity    Alcohol use: Yes     Comment: occasional    Drug use: No   Other Topics Concern    Caffeine Concern Yes    Exercise Yes     PHYSICAL EXAM:     Vitals:    12/03/24 0919 12/03/24 0928   BP: (!) 175/99 (!) 168/88  Comment: manually   Pulse: 87    Resp: 14    Temp: 98 °F (36.7 °C)    Weight: 290 lb (131.5 kg)    Height: 70\"       Estimated body mass index is 41.61 kg/m² as calculated from the following:    Height as of this encounter: 70\".    Weight as of this encounter: 290 lb (131.5 kg).   No results found for: \"PGLU\"    Vital signs reviewed.Appears stated age, well groomed.    Constitutional:  Bp is elevated,  wnl for patient's trend in previous medical offices. Pulse Regular and wnl for patient. Respirations easy and unlabored. Temperature wnl. obese. Appearance neat and clean. Appears in no acute distress. Well nourished and well developed.    Lower extremity:  dp/pt palpable bilaterally. Strong pulsatile signals at the dp/pt/distal hallux bilaterally.  Bilateral lower extremities free of varicosities, +edema. Capillary refill < 3 seconds. Digits are warm. toenails are wnl for color, thickness and hygeine. Skin hydration wnl. + hairgrowth on legs, feet and toes.  Musculoskeletal:  Gait and station stable   Integumentary:  refer to wound characteristics and images   Psychiatric:  Judgment and insight intact. Alert and oriented times 3. No evidence of depression, anxiety, or agitation. Calm, cooperative, and communicative.   EDEMA:   Calf  Point of Measurement - Left Calf: 37  Point of Measurement - Right Calf: 37  Left Calf from:: Heel  Calf Left cm:: 47  Right Calf from:: Heel  Right Calf cm:: 47.5  Ankle  Point of Measurement - Left Ankle: 13  Point of Measurement - Right Ankle: 13  Left Ankle from:: Heel  Left Ankle cm:: 26.2     Right Ankle  from:: Heel  Right Ankle cm:: 28.2     DIAGNOSTICS:     Lab Results   Component Value Date    BUN 15 11/29/2024    CREATSERUM 1.26 11/29/2024    ALB 5.2 (H) 11/29/2024    TP 8.3 (H) 11/29/2024       WOUND ASSESSMENT:     Wound 12/03/24 #1 Leg Right;Medial (Active)   Date First Assessed/Time First Assessed: 12/03/24 0916    Wound Number (Wound Clinic Only): #1  Primary Wound Type: Venous Ulcer  Location: Leg  Wound Location Orientation: Right;Medial      Assessments 12/3/2024  9:18 AM   Wound Image      Drainage Amount Unable to assess   Wound Length (cm) 0.6 cm   Wound Width (cm) 0.6 cm   Wound Surface Area (cm^2) 0.36 cm^2   Wound Depth (cm) 0.1 cm   Wound Volume (cm^3) 0.036 cm^3   Margins Well-defined edges   Leigh Ann-wound Assessment Blanchable erythema;Edema   Wound Granulation Tissue Red;Firm   Wound Bed Granulation (%) 100 %       No associated orders.              ASSESSMENT AND PLAN:    1. Ulcer of right pretibial region with fat layer exposed (HCC)    2. Venous hypertension, chronic, with ulcer and inflammation, right (HCC)    3. Bug bite, sequela          Discussed with patient the aspects of wound healing including:  blood flow in/out (arterial vs venous) and managing edema with appropriate compression, wound bed optimization including moist wound healing, removal of necrosis, bioburden control, monitoring for infection,and finally the patient as a whole including nutrition and increased protein intake and blood pressure management      Risks, benefits, and alternatives of current treatment plan discussed in detail.  Questions and concerns addressed. Red flags to RTC or ED reviewed.  Patient (or parent) agrees to plan.      NOTE TO PATIENT: The 21st Century Cures Act makes clinical notes like these available to patients in the interest of transparency. Clinical notes are medical documents used by physicians and care providers to communicate with each other. These documents include medical language and  terminology, abbreviations, and treatment information that may sound technical and at times possibly unfamiliar. In addition, at times, the verbiage may appear blunt or direct. These documents are one tool providers use to communicate relevant information and clinical opinions of the care providers in a way that allows common understanding of the clinical context.   Patient is new to clinic, pcp is paola.  I spent   40   minutes with the patient. This time included:    preparing to see the patient (eg, review notes and recent diagnostics),  seeing the patient, obtaining and/or reviewing separately obtained history, performing a medically appropriate examination and/or evaluation, counseling and educating the patient, documenting in the record   DISCHARGE:      Patient Instructions   Please return: Thursday or Friday for RN visit for wound assessment, edema management, and if applicable reapplication of multilayer compression bandage system.    1 week with Vilma    Patient discharge and wound care instructions  James Cooper  12/3/2024         You may shower with protection of the wound (ie a cast cover or similar).     Cleansing/dressing:     In clinic/, with each dressing change:    please cleanse the limb, foot, and between toes with soap, water and washcloth.   Dry thoroughly.    Cleanse/soak the wound with VASHE (or other hypochlorous wound cleanser), dab dry.    Apply the following dressings:     betamethasone to periwound  Charline>xeroform>abd pad    Compression:    30-40mmhg calamine + spandagrip from ankle to knee  Managing your edema:  Avoid prolonged standing in one place. It is better to have your calf muscles moving to pump fluid out of the legs      Elevate leg(s) above the level of the heart when sitting or as much as possible.  Take you diuretics as directed by your physician. Do not skip doses or change doses unless instructed to do so by your physician.  Decrease salt intake, follow  recommended 2 grams daily.  Do not get leg(s) with compression wrap wet. If wraps are too tight as indicated by pain, numbness/tingling or discoloration of toes remove wrap completely and call the wound center @ [phone].  Refer to the \"Multi-layer compression bandage application patient information sheet\" given to you in clinic.    What Are Compression Wraps?   Compression wraps are elastic bandages that wrap around a part of your body to keep swelling down. The wraps create pressure which pushes extra fluid out of a certain area. This improves blood flow to that part of the body and helps it heal faster.   Compression wraps come in different styles. Your doctor will recommend the best compression wrap for your needs.  How Do I Take Care of a Compression Wrap?   Compression wraps can be worn for up to seven days if you take good care of them. Here's how to make them last and keep them working right:   Keep them clean and dry until your next doctor's appointment.   Wear thin, stretchable stockings over the wrap to hold it in place. This keeps the wrap from sticking to your sheets while sleeping. It also keeps your clothing from sticking to the wrap.   Wear shoes that can fit comfortably around a wrap that covers your leg and foot.     Nutrition and blood sugar control:  Focus on the following:  Protein: Meats, beans, eggs, milk and yogurt particularly Greek yogurt), tofu, soy nuts, soy protein products (Follow the protein handout in your welcome folder)  Vitamin C: Citrus fruits and juices, strawberries, tomatoes, tomato juice, peppers, baked potatoes, spinach, broccoli, cauliflower, [place] sprouts, cabbage  Vitamin A: Dark green, leafy vegetables, orange or yellow vegetables, cantaloupe, fortified dairy products, liver, fortified cereals  Zinc: Fortified cereals, red meats, seafood  Consider supplementing with Efrain by [employer]. It can be purchased on amazon, [employer] website, or local pharmacy may be able  to order it for you.  (These are essential branch chain amino acids that help with tissue building and wound healing).     Concerns:  Signs of infection may include the following:  Increase in redness  Red \"streaks\" from wound  Increase in swelling  Fever  Unusual odor  Change in the amount of wound drainage     Should you experience any significant changes in your wound(s) or have any questions regarding your home care instructions please contact the [facility] @ [phone] If after regular business hours, please call your family doctor or local emergency room. The treatment plan has been discussed at length between you and your provider. Follow all instructions carefully, it is very important. If you do not follow all instructions you are at risk of your wound not healing, infection, possible loss of limb and even loss of life.       Vilma Means FNP-C, CWCN-AP, CFCN, CSWS, WCC, DWC  12/3/2024            [1] No Known Allergies

## 2024-12-03 NOTE — PROGRESS NOTES
.Weekly Wound Education Note    Teaching Provided To: Patient  Training Topics: Dressing;Edema control;Cleasing and general instructions;Compression;Discharge instructions  Training Method: Explain/Verbal;Written  Training Response: Patient responds and understands        Notes: Inital visit for right leg wound. Start janelle, folded xeroform, ABD pad, and calamine unna boot 30-40mmHg with spandagrip E from ankle to knee. Clobetasol applied to periwound. Pt is to get blood pressure cuff and start a log for his blood pressure. Nurse visit later this week.

## 2024-12-03 NOTE — PATIENT INSTRUCTIONS
Please return: Thursday or Friday for RN visit for wound assessment, edema management, and if applicable reapplication of multilayer compression bandage system.    1 week with Vilma    Patient discharge and wound care instructions  James Cooper  12/3/2024         You may shower with protection of the wound (ie a cast cover or similar).     Cleansing/dressing:     In clinic/, with each dressing change:    please cleanse the limb, foot, and between toes with soap, water and washcloth.   Dry thoroughly.    Cleanse/soak the wound with VASHE (or other hypochlorous wound cleanser), dab dry.    Apply the following dressings:     betamethasone to periwound  Charline>xeroform>abd pad    Compression:    30-40mmhg calamine + spandagrip from ankle to knee  Managing your edema:  Avoid prolonged standing in one place. It is better to have your calf muscles moving to pump fluid out of the legs      Elevate leg(s) above the level of the heart when sitting or as much as possible.  Take you diuretics as directed by your physician. Do not skip doses or change doses unless instructed to do so by your physician.  Decrease salt intake, follow recommended 2 grams daily.  Do not get leg(s) with compression wrap wet. If wraps are too tight as indicated by pain, numbness/tingling or discoloration of toes remove wrap completely and call the wound center @ [phone].  Refer to the \"Multi-layer compression bandage application patient information sheet\" given to you in clinic.    What Are Compression Wraps?   Compression wraps are elastic bandages that wrap around a part of your body to keep swelling down. The wraps create pressure which pushes extra fluid out of a certain area. This improves blood flow to that part of the body and helps it heal faster.   Compression wraps come in different styles. Your doctor will recommend the best compression wrap for your needs.  How Do I Take Care of a Compression Wrap?   Compression wraps can  be worn for up to seven days if you take good care of them. Here's how to make them last and keep them working right:   Keep them clean and dry until your next doctor's appointment.   Wear thin, stretchable stockings over the wrap to hold it in place. This keeps the wrap from sticking to your sheets while sleeping. It also keeps your clothing from sticking to the wrap.   Wear shoes that can fit comfortably around a wrap that covers your leg and foot.     Nutrition and blood sugar control:  Focus on the following:  Protein: Meats, beans, eggs, milk and yogurt particularly Greek yogurt), tofu, soy nuts, soy protein products (Follow the protein handout in your welcome folder)  Vitamin C: Citrus fruits and juices, strawberries, tomatoes, tomato juice, peppers, baked potatoes, spinach, broccoli, cauliflower, [place] sprouts, cabbage  Vitamin A: Dark green, leafy vegetables, orange or yellow vegetables, cantaloupe, fortified dairy products, liver, fortified cereals  Zinc: Fortified cereals, red meats, seafood  Consider supplementing with Efrain by [employer]. It can be purchased on amazon, [employer] website, or local pharmacy may be able to order it for you.  (These are essential branch chain amino acids that help with tissue building and wound healing).     Concerns:  Signs of infection may include the following:  Increase in redness  Red \"streaks\" from wound  Increase in swelling  Fever  Unusual odor  Change in the amount of wound drainage     Should you experience any significant changes in your wound(s) or have any questions regarding your home care instructions please contact the [facility] @ [phone] If after regular business hours, please call your family doctor or local emergency room. The treatment plan has been discussed at length between you and your provider. Follow all instructions carefully, it is very important. If you do not follow all instructions you are at risk of your wound not  healing, infection, possible loss of limb and even loss of life.

## 2024-12-05 ENCOUNTER — OFFICE VISIT (OUTPATIENT)
Dept: WOUND CARE | Facility: HOSPITAL | Age: 34
End: 2024-12-05
Attending: NURSE PRACTITIONER
Payer: COMMERCIAL

## 2024-12-05 VITALS
DIASTOLIC BLOOD PRESSURE: 100 MMHG | TEMPERATURE: 98 F | RESPIRATION RATE: 16 BRPM | HEART RATE: 87 BPM | SYSTOLIC BLOOD PRESSURE: 160 MMHG

## 2024-12-05 DIAGNOSIS — I87.331 VENOUS HYPERTENSION, CHRONIC, WITH ULCER AND INFLAMMATION, RIGHT (HCC): Primary | ICD-10-CM

## 2024-12-05 PROCEDURE — 29581 APPL MULTLAYER CMPRN SYS LEG: CPT

## 2024-12-05 NOTE — PROGRESS NOTES
Chief Complaint   Patient presents with    Wound Care     Patient is here for a RN visit. He denies any pain or new wound concerns.         No current outpatient medications on file.    Allergies[1]       HISTORY:     Past medical, surgical, family and social history updated where appropriate.    PHYSICAL EXAM:   BP (!) 160/100   Pulse 87   Temp 97.7 °F (36.5 °C)   Resp 16        Vital signs reviewed.      Calf     Point of Measurement - Right Calf: 37        Right Calf from:: Heel  Right Calf cm:: 46    Ankle     Point of Measurement - Right Ankle: 13           Right Ankle from:: Heel  Right Ankle cm:: 25.7       Wound 12/03/24 #1 Leg Right;Medial (Active)   Date First Assessed/Time First Assessed: 12/03/24 0916    Wound Number (Wound Clinic Only): #1  Primary Wound Type: Venous Ulcer  Location: Leg  Wound Location Orientation: Right;Medial      Assessments 12/3/2024  9:18 AM 12/5/2024  4:17 PM   Wound Image        Drainage Amount Unable to assess Scant   Drainage Description -- Serous;Yellow   Treatments Compression Compression   Wound Length (cm) 0.6 cm 0.8 cm   Wound Width (cm) 0.6 cm 0.5 cm   Wound Surface Area (cm^2) 0.36 cm^2 0.4 cm^2   Wound Depth (cm) 0.1 cm 0.2 cm   Wound Volume (cm^3) 0.036 cm^3 0.08 cm^3   Wound Healing % -- -122   Margins Well-defined edges Well-defined edges   Non-staged Wound Description -- Full thickness   Leigh Ann-wound Assessment Blanchable erythema;Edema Edema   Wound Granulation Tissue Red;Firm Pink;Firm   Wound Bed Granulation (%) 100 % 100 %   Wound Odor -- None       No associated orders.       Compression Wrap 12/03/24 Leg Anterior;Right (Active)   Placement Date/Time: 12/03/24 1216   Location: Leg  Wound Location Orientation: Anterior;Right      Assessments 12/3/2024  9:18 AM 12/5/2024  4:17 PM   Response to Treatment Well tolerated Well tolerated   Compression Layers Multilayer Multilayer   Compression Product Type Unna Boot;Tubigrip/Spanda Unna Boot;Tubigrip/Spanda    Dressing Applied Yes Yes   Compression Wrap Location Toes to Knee Toes to Knee   Compression Wrap Status Clean;Dry;Intact Clean;Dry;Intact       No associated orders.          ASSESSMENT AND PLAN:        Risks, benefits, and alternatives of current treatment plan discussed in detail.  Questions and concerns addressed. Red flags to RTC or ED reviewed.  Patient (or parent) agrees to plan.      No follow-ups on file.  Weekly Wound Education Note    Teaching Provided To: Patient  Training Topics: Discharge instructions;Dressing;Cleasing and general instructions;Compression;Edema control  Training Method: Demonstration;Explain/Verbal  Training Response: Reinforcement needed        Notes: Patient here for nurse visit to right medial lower leg. Patient denies any concerns or issues. Edema measurement decreased, wound measurement increased. Continue with janelle to wound bed coverd with xeroform, (no betamethasone- pt states he did not have any itching), ABD pad. Wrappd RLE in calamine unna boot 30-40mmHg and spandagrip (E) from ankle to below the knee. Pt to follow up with provider on 12/10/24. Pt states he did place an order for BP machine- states it should arrive Monday. Pt reports having alot of anxiety- denies any issues-no headache, no blurred visiion, pt reports feeling fine.               Armin WILSON RN   12/5/2024  4:26 PM            [1] No Known Allergies

## 2024-12-09 NOTE — PROGRESS NOTES
CHIEF COMPLAINT:     No chief complaint on file.    HPI:   Information obtained from patient and chart  12-3-24 INITIAL:  patient is a 33 yo male with pmhx of adhd, htn and obesity.  In August he presented to urgent care with an area of cellulitis on his right lower leg. He states that he is certain it was a bug bite as he was outside, felt it and then the area started to swell.  At urgent care it was not clear if it was true cellulitis or a reaction to an ointment he had been using on the area.  He was treated with clindamycin followed by a week of bactrim.. when he followed up with pcp recently for his yearly physical it was noted that the eschar was still present and patient was referred to wound clinic. Noted during visits in the summer and recently patient with elevated bp-untreated at this time. We discussed monitoring at home and keeping a log and risks of consistently high bp to other organ systems.  He has been treating the area with an ointment from [facility] that his parents recommended.  Today there is not any s/s of infection. 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 He does sleep in a bed and denies any other medical hx.  No c/o pain. We discussed compression, patient is agreeable to treatment plan.    12-10-24 patient returns. He did get a home bp machine ***. Will start monitoring. He is tolerating the compression.  Wound is ***    MEDICATIONS:   No current outpatient medications on file.  ALLERGIES:   Allergies[1]   REVIEW OF SYSTEMS:   This information was obtained from the patient/family and chart.    See HPI for pertinent positives, otherwise 10 pt ROS negative.  HISTORY:   Past medical, surgical, family and social history updated where appropriate.      PHYSICAL EXAM:     There were no vitals filed for this visit.     Estimated body mass index is 41.61 kg/m² as calculated from the following:    Height as of 12/3/24: 70\".    Weight as of 12/3/24: 290 lb (131.5 kg).   No results found  for: \"PGLU\"    Vital signs reviewed.Appears stated age, well groomed.    Constitutional:  Bp ***is elevated,  ***. Pulse Regular and wnl for patient. Respirations easy and unlabored. Temperature wnl. obese. Appearance neat and clean. Appears in no acute distress. Well nourished and well developed.    Lower extremity:  dp/pt palpable ***right.  right lower extremities free of varicosities, +edema***. Capillary refill < 3 seconds. Digits are warm. toenails are wnl for color, thickness and hygeine. Skin hydration wnl. + hairgrowth on legs, feet and toes.  Musculoskeletal:  Gait and station stable   Integumentary:  refer to wound characteristics and images   Psychiatric:  Judgment and insight intact. Alert and oriented times 3. No evidence of depression, anxiety, or agitation. Calm, cooperative, and communicative.   EDEMA:   Calf                    Ankle                          DIAGNOSTICS:     Lab Results   Component Value Date    BUN 15 11/29/2024    CREATSERUM 1.26 11/29/2024    ALB 5.2 (H) 11/29/2024    TP 8.3 (H) 11/29/2024       WOUND ASSESSMENT:     Wound 12/03/24 #1 Leg Right;Medial (Active)   Date First Assessed/Time First Assessed: 12/03/24 0916    Wound Number (Wound Clinic Only): #1  Primary Wound Type: Venous Ulcer  Location: Leg  Wound Location Orientation: Right;Medial      Assessments 12/3/2024  9:18 AM 12/5/2024  4:17 PM   Wound Image        Drainage Amount Unable to assess Scant   Drainage Description -- Serous;Yellow   Treatments Compression (Unna boot 30-40mmHg, spandagrip E) Compression (calamine unna boot 30-40mmHg &spandagrip (E))   Wound Length (cm) 0.6 cm 0.8 cm   Wound Width (cm) 0.6 cm 0.5 cm   Wound Surface Area (cm^2) 0.36 cm^2 0.4 cm^2   Wound Depth (cm) 0.1 cm 0.2 cm   Wound Volume (cm^3) 0.036 cm^3 0.08 cm^3   Wound Healing % -- -122   Margins Well-defined edges Well-defined edges   Non-staged Wound Description -- Full thickness   Leigh Ann-wound Assessment Blanchable erythema;Edema Edema    Wound Granulation Tissue Red;Firm Pink;Firm   Wound Bed Granulation (%) 100 % 100 %   Wound Odor -- None       No associated orders.       Compression Wrap 12/03/24 Leg Anterior;Right (Active)   Placement Date/Time: 12/03/24 1216   Location: Leg  Wound Location Orientation: Anterior;Right      Assessments 12/3/2024  9:18 AM 12/5/2024  4:17 PM   Response to Treatment Well tolerated Well tolerated   Compression Layers Multilayer Multilayer   Compression Product Type Unna Boot;Tubigrip/Spanda (Unna boot 30-40mmHg) Unna Boot;Tubigrip/Spanda (calamine unna boot 30-40mmHg)   Dressing Applied Yes (Charline, xeroform) Yes (charline,xeroform,ABD pad)   Compression Wrap Location Toes to Knee Toes to Knee   Compression Wrap Status Clean;Dry;Intact Clean;Dry;Intact       No associated orders.              ASSESSMENT AND PLAN:    There are no diagnoses linked to this encounter.        Risks, benefits, and alternatives of current treatment plan discussed in detail.  Questions and concerns addressed. Red flags to RTC or ED reviewed.  Patient (or parent) agrees to plan.      NOTE TO PATIENT: The 21st Century Cures Act makes clinical notes like these available to patients in the interest of transparency. Clinical notes are medical documents used by physicians and care providers to communicate with each other. These documents include medical language and terminology, abbreviations, and treatment information that may sound technical and at times possibly unfamiliar. In addition, at times, the verbiage may appear blunt or direct. These documents are one tool providers use to communicate relevant information and clinical opinions of the care providers in a way that allows common understanding of the clinical context.    I spent *** minutes with the patient. This time included:    preparing to see the patient (eg, review notes and recent diagnostics),  seeing the patient, obtaining and/or reviewing separately obtained history, performing a  medically appropriate examination and/or evaluation, counseling and educating the patient, documenting in the record   DISCHARGE:      There are no Patient Instructions on file for this visit.   Vilma Means FNP-C, CWCN-AP, CFCN, CSWS, WCC, DWC  12/10/2024            [1] No Known Allergies     your physician. Do not skip doses or change doses unless instructed to do so by your physician.  Decrease salt intake, follow recommended 2 grams daily.  Do not get leg(s) with compression wrap wet. If wraps are too tight as indicated by pain, numbness/tingling or discoloration of toes remove wrap completely and call the wound center @ [phone].  Refer to the \"Multi-layer compression bandage application patient information sheet\" given to you in clinic.    What Are Compression Wraps?   Compression wraps are elastic bandages that wrap around a part of your body to keep swelling down. The wraps create pressure which pushes extra fluid out of a certain area. This improves blood flow to that part of the body and helps it heal faster.   Compression wraps come in different styles. Your doctor will recommend the best compression wrap for your needs.  How Do I Take Care of a Compression Wrap?   Compression wraps can be worn for up to seven days if you take good care of them. Here's how to make them last and keep them working right:   Keep them clean and dry until your next doctor's appointment.   Wear thin, stretchable stockings over the wrap to hold it in place. This keeps the wrap from sticking to your sheets while sleeping. It also keeps your clothing from sticking to the wrap.   Wear shoes that can fit comfortably around a wrap that covers your leg and foot.     Nutrition and blood sugar control:  Focus on the following:  Protein: Meats, beans, eggs, milk and yogurt particularly Greek yogurt), tofu, soy nuts, soy protein products (Follow the protein handout in your welcome folder)  Vitamin C: Citrus fruits and juices, strawberries, tomatoes, tomato juice, peppers, baked potatoes, spinach, broccoli, cauliflower, [place] sprouts, cabbage  Vitamin A: Dark green, leafy vegetables, orange or yellow vegetables, cantaloupe, fortified dairy products, liver, fortified cereals  Zinc: Fortified cereals, red meats,  seafood  Consider supplementing with Efrain by [employer]. It can be purchased on amazon, [employer] website, or local pharmacy may be able to order it for you.  (These are essential branch chain amino acids that help with tissue building and wound healing).     Concerns:  Signs of infection may include the following:  Increase in redness  Red \"streaks\" from wound  Increase in swelling  Fever  Unusual odor  Change in the amount of wound drainage     Should you experience any significant changes in your wound(s) or have any questions regarding your home care instructions please contact the [facility] @ [phone] If after regular business hours, please call your family doctor or local emergency room. The treatment plan has been discussed at length between you and your provider. Follow all instructions carefully, it is very important. If you do not follow all instructions you are at risk of your wound not healing, infection, possible loss of limb and even loss of life.       Vilma Means FNP-C, CWCN-AP, CFCN, CSWS, WCC, DWC  12/10/2024            [1] No Known Allergies

## 2024-12-10 ENCOUNTER — APPOINTMENT (OUTPATIENT)
Dept: WOUND CARE | Facility: HOSPITAL | Age: 34
End: 2024-12-10
Attending: NURSE PRACTITIONER
Payer: COMMERCIAL

## 2024-12-10 VITALS
SYSTOLIC BLOOD PRESSURE: 158 MMHG | RESPIRATION RATE: 14 BRPM | DIASTOLIC BLOOD PRESSURE: 87 MMHG | TEMPERATURE: 98 F | HEART RATE: 95 BPM

## 2024-12-10 DIAGNOSIS — L97.812 ULCER OF RIGHT PRETIBIAL REGION WITH FAT LAYER EXPOSED (HCC): ICD-10-CM

## 2024-12-10 DIAGNOSIS — I87.331 VENOUS HYPERTENSION, CHRONIC, WITH ULCER AND INFLAMMATION, RIGHT (HCC): Primary | ICD-10-CM

## 2024-12-10 DIAGNOSIS — I10 PRIMARY HYPERTENSION: ICD-10-CM

## 2024-12-10 PROCEDURE — 99214 OFFICE O/P EST MOD 30 MIN: CPT | Performed by: NURSE PRACTITIONER

## 2024-12-10 NOTE — PATIENT INSTRUCTIONS
Please return:   1 week with Vilma    Make an appointment with dr. Kumar for your blood pressure, bring your log on your phone    We will order compression stockings, bring them to next appointment    Patient discharge and wound care instructions  James Cooper  12/10/2024       You may shower with protection of the wound (ie a cast cover or similar).     Cleansing/dressing:     In clinic/, with each dressing change:    please cleanse the limb, foot, and between toes with soap, water and washcloth.   Dry thoroughly.    Cleanse/soak the wound with VASHE (or other hypochlorous wound cleanser), dab dry.    Apply the following dressings:     betamethasone to periwound  Charline>xeroform>abd pad    Compression:    30-40mmhg calamine     Managing your edema:  Avoid prolonged standing in one place. It is better to have your calf muscles moving to pump fluid out of the legs      Elevate leg(s) above the level of the heart when sitting or as much as possible.  Take you diuretics as directed by your physician. Do not skip doses or change doses unless instructed to do so by your physician.  Decrease salt intake, follow recommended 2 grams daily.  Do not get leg(s) with compression wrap wet. If wraps are too tight as indicated by pain, numbness/tingling or discoloration of toes remove wrap completely and call the wound center @ [phone].  Refer to the \"Multi-layer compression bandage application patient information sheet\" given to you in clinic.    What Are Compression Wraps?   Compression wraps are elastic bandages that wrap around a part of your body to keep swelling down. The wraps create pressure which pushes extra fluid out of a certain area. This improves blood flow to that part of the body and helps it heal faster.   Compression wraps come in different styles. Your doctor will recommend the best compression wrap for your needs.  How Do I Take Care of a Compression Wrap?   Compression wraps can be worn for up  to seven days if you take good care of them. Here's how to make them last and keep them working right:   Keep them clean and dry until your next doctor's appointment.   Wear thin, stretchable stockings over the wrap to hold it in place. This keeps the wrap from sticking to your sheets while sleeping. It also keeps your clothing from sticking to the wrap.   Wear shoes that can fit comfortably around a wrap that covers your leg and foot.     Nutrition and blood sugar control:  Focus on the following:  Protein: Meats, beans, eggs, milk and yogurt particularly Greek yogurt), tofu, soy nuts, soy protein products (Follow the protein handout in your welcome folder)  Vitamin C: Citrus fruits and juices, strawberries, tomatoes, tomato juice, peppers, baked potatoes, spinach, broccoli, cauliflower, [place] sprouts, cabbage  Vitamin A: Dark green, leafy vegetables, orange or yellow vegetables, cantaloupe, fortified dairy products, liver, fortified cereals  Zinc: Fortified cereals, red meats, seafood  Consider supplementing with Efrain by [employer]. It can be purchased on amazon, [employer] website, or local pharmacy may be able to order it for you.  (These are essential branch chain amino acids that help with tissue building and wound healing).     Concerns:  Signs of infection may include the following:  Increase in redness  Red \"streaks\" from wound  Increase in swelling  Fever  Unusual odor  Change in the amount of wound drainage     Should you experience any significant changes in your wound(s) or have any questions regarding your home care instructions please contact the [facility] @ [phone] If after regular business hours, please call your family doctor or local emergency room. The treatment plan has been discussed at length between you and your provider. Follow all instructions carefully, it is very important. If you do not follow all instructions you are at risk of your wound not healing, infection,  possible loss of limb and even loss of life.

## 2024-12-10 NOTE — PROGRESS NOTES
.Weekly Wound Education Note    Teaching Provided To: Patient  Training Topics: Dressing;Edema control;Cleasing and general instructions;Compression;Discharge instructions  Training Method: Explain/Verbal;Written  Training Response: Patient responds and understands        Notes: Wound improving. Continue janelle, folded xeroform and calamine unna boot 30-40mmHg with folded ABD pad between layers over wound area. Will order compression stocking this visit.

## 2024-12-16 NOTE — PROGRESS NOTES
CHIEF COMPLAINT:     No chief complaint on file.    HPI:   Information obtained from patient and chart  12-3-24 INITIAL:  patient is a 33 yo male with pmhx of adhd, htn and obesity.  In August he presented to urgent care with an area of cellulitis on his right lower leg. He states that he is certain it was a bug bite as he was outside, felt it and then the area started to swell.  At urgent care it was not clear if it was true cellulitis or a reaction to an ointment he had been using on the area.  He was treated with clindamycin followed by a week of bactrim.. when he followed up with pcp recently for his yearly physical it was noted that the eschar was still present and patient was referred to wound clinic. Noted during visits in the summer and recently patient with elevated bp-untreated at this time. We discussed monitoring at home and keeping a log and risks of consistently high bp to other organ systems.  He has been treating the area with an ointment from [facility] that his parents recommended.  Today there is not any s/s of infection. 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 He does sleep in a bed and denies any other medical hx.  No c/o pain. We discussed compression, patient is agreeable to treatment plan.    12-10-24 patient returns. He did get a home bp machine and he states that he has been monitoring at home and his systolic is 150-170 diastolics around 100. We discussed that he needs to f/u with primary because he will likely need bp med. He is tolerating the compression.  Wound is smaller and without s/s of infection. We also discussed compression for post healing. Will order 2 layer stockings. I will update primary    12-17-24 patient returns. He is not able to get in to his primary until jan 13.  His bp at home has been ***,  his diet is ***, exercise?*** send message to primary with log of bp. ***compression stockings? *** wound ***    MEDICATIONS:   No current outpatient medications on  file.  ALLERGIES:   Allergies[1]   REVIEW OF SYSTEMS:   This information was obtained from the patient/family and chart.    See HPI for pertinent positives, otherwise 10 pt ROS negative.  HISTORY:   Past medical, surgical, family and social history updated where appropriate.      PHYSICAL EXAM:     There were no vitals filed for this visit.       Estimated body mass index is 41.61 kg/m² as calculated from the following:    Height as of 12/3/24: 70\".    Weight as of 12/3/24: 290 lb (131.5 kg).   No results found for: \"PGLU\"    Vital signs reviewed.Appears stated age, well groomed.    Constitutional:  Bp*** elevated, patient denies symptoms, will continue to monitor ***and f/u with pcp. Pulse Regular and wnl for patient. Respirations easy and unlabored. Temperature wnl. obese. Appearance neat and clean. Appears in no acute distress. Well nourished and well developed.    Lower extremity:  dp/pt palpable*** right.  right lower extremities free of varicosities, +edema*** stable. Capillary refill < 3 seconds. Digits are warm. toenails are wnl for color, thickness and hygeine. Skin hydration wnl. + hairgrowth on legs, feet and toes.  Musculoskeletal:  Gait and station stable   Integumentary:  refer to wound characteristics and images   Psychiatric:  Judgment and insight intact. Alert and oriented times 3. No evidence of depression, anxiety, or agitation. Calm, cooperative, and communicative.   EDEMA:   Calf                    Ankle                          DIAGNOSTICS:     Lab Results   Component Value Date    BUN 15 11/29/2024    CREATSERUM 1.26 11/29/2024    ALB 5.2 (H) 11/29/2024    TP 8.3 (H) 11/29/2024       WOUND ASSESSMENT:     Wound 12/03/24 #1 Leg Right;Medial (Active)   Date First Assessed/Time First Assessed: 12/03/24 0916    Wound Number (Wound Clinic Only): #1  Primary Wound Type: Venous Ulcer  Location: Leg  Wound Location Orientation: Right;Medial      Assessments 12/3/2024  9:18 AM 12/10/2024  4:12 PM    Wound Image        Drainage Amount Unable to assess Scant   Drainage Description -- Serosanguineous   Treatments Compression (Unna boot 30-40mmHg, spandagrip E) Compression (Unna boot 30-40mmHg)   Wound Length (cm) 0.6 cm 0.3 cm   Wound Width (cm) 0.6 cm 0.3 cm   Wound Surface Area (cm^2) 0.36 cm^2 0.09 cm^2   Wound Depth (cm) 0.1 cm 0.1 cm   Wound Volume (cm^3) 0.036 cm^3 0.009 cm^3   Wound Healing % -- 75   Margins Well-defined edges Well-defined edges   Non-staged Wound Description -- Full thickness   Leigh Ann-wound Assessment Blanchable erythema;Edema Edema   Wound Granulation Tissue Red;Firm Pink;Spongy   Wound Bed Granulation (%) 100 % 100 %   Wound Odor -- None   Tunneling? -- No   Undermining? -- No   Sinus Tracts? -- No       No associated orders.       Compression Wrap 12/03/24 Leg Anterior;Right (Active)   Placement Date/Time: 12/03/24 1216   Location: Leg  Wound Location Orientation: Anterior;Right      Assessments 12/3/2024  9:18 AM 12/10/2024  4:12 PM   Response to Treatment Well tolerated Well tolerated   Compression Layers Multilayer Multilayer   Compression Product Type Unna Boot;Tubigrip/Spanda (Unna boot 30-40mmHg) Unna Boot (Unna boot 30-40mmHg)   Dressing Applied Yes (Charline, xeroform) Yes (Charline, xeroform, ABD pad)   Compression Wrap Location Toes to Knee Toes to Knee   Compression Wrap Status Clean;Dry;Intact Clean;Dry;Intact       No associated orders.              ASSESSMENT AND PLAN:    There are no diagnoses linked to this encounter.          Risks, benefits, and alternatives of current treatment plan discussed in detail.  Questions and concerns addressed. Red flags to RTC or ED reviewed.  Patient (or parent) agrees to plan.      NOTE TO PATIENT: The 21st Century Cures Act makes clinical notes like these available to patients in the interest of transparency. Clinical notes are medical documents used by physicians and care providers to communicate with each other. These documents include  medical language and terminology, abbreviations, and treatment information that may sound technical and at times possibly unfamiliar. In addition, at times, the verbiage may appear blunt or direct. These documents are one tool providers use to communicate relevant information and clinical opinions of the care providers in a way that allows common understanding of the clinical context.    I spent *** minutes with the patient. This time included:    preparing to see the patient (eg, review notes and recent diagnostics),  seeing the patient, obtaining and/or reviewing separately obtained history, performing a medically appropriate examination and/or evaluation, counseling and educating the patient, documenting in the record, ***  DISCHARGE:      There are no Patient Instructions on file for this visit.   Vilma Means FNP-C, CWCN-AP, CFCN, CSWS, WCC, DWC  12/17/2024            [1] No Known Allergies

## 2024-12-17 ENCOUNTER — TELEPHONE (OUTPATIENT)
Dept: WOUND CARE | Facility: HOSPITAL | Age: 34
End: 2024-12-17

## 2024-12-17 ENCOUNTER — APPOINTMENT (OUTPATIENT)
Dept: WOUND CARE | Facility: HOSPITAL | Age: 34
End: 2024-12-17
Attending: NURSE PRACTITIONER
Payer: COMMERCIAL

## 2024-12-18 ENCOUNTER — OFFICE VISIT (OUTPATIENT)
Dept: WOUND CARE | Facility: HOSPITAL | Age: 34
End: 2024-12-18
Attending: NURSE PRACTITIONER
Payer: COMMERCIAL

## 2024-12-18 VITALS
DIASTOLIC BLOOD PRESSURE: 92 MMHG | SYSTOLIC BLOOD PRESSURE: 160 MMHG | TEMPERATURE: 97 F | RESPIRATION RATE: 16 BRPM | HEART RATE: 90 BPM

## 2024-12-18 DIAGNOSIS — I10 PRIMARY HYPERTENSION: ICD-10-CM

## 2024-12-18 DIAGNOSIS — I87.331 VENOUS HYPERTENSION, CHRONIC, WITH ULCER AND INFLAMMATION, RIGHT (HCC): Primary | ICD-10-CM

## 2024-12-18 DIAGNOSIS — L97.812 ULCER OF RIGHT PRETIBIAL REGION WITH FAT LAYER EXPOSED (HCC): ICD-10-CM

## 2024-12-18 PROCEDURE — 99215 OFFICE O/P EST HI 40 MIN: CPT | Performed by: NURSE PRACTITIONER

## 2024-12-18 NOTE — PATIENT INSTRUCTIONS
Please return:   1 week with Vilma     Try to decrease salt intake this week      Patient discharge and wound care instructions  James Marck Greeneron  12/18/2024          You may shower with protection of the wound (ie a cast cover or similar).     Cleansing/dressing:     In clinic/, with each dressing change:    please cleanse the limb, foot, and between toes with soap, water and washcloth.   Dry thoroughly.    Cleanse/soak the wound with VASHE (or other hypochlorous wound cleanser), dab dry.    Apply the following dressings:     betamethasone to periwound  Open xeroform>abd pad    Compression:    30-40mmhg calamine     Managing your edema:  Avoid prolonged standing in one place. It is better to have your calf muscles moving to pump fluid out of the legs      Elevate leg(s) above the level of the heart when sitting or as much as possible.  Take you diuretics as directed by your physician. Do not skip doses or change doses unless instructed to do so by your physician.  Decrease salt intake, follow recommended 2 grams daily.  Do not get leg(s) with compression wrap wet. If wraps are too tight as indicated by pain, numbness/tingling or discoloration of toes remove wrap completely and call the wound center @ [phone].  Refer to the \"Multi-layer compression bandage application patient information sheet\" given to you in clinic.    What Are Compression Wraps?   Compression wraps are elastic bandages that wrap around a part of your body to keep swelling down. The wraps create pressure which pushes extra fluid out of a certain area. This improves blood flow to that part of the body and helps it heal faster.   Compression wraps come in different styles. Your doctor will recommend the best compression wrap for your needs.  How Do I Take Care of a Compression Wrap?   Compression wraps can be worn for up to seven days if you take good care of them. Here's how to make them last and keep them working right:   Keep them  clean and dry until your next doctor's appointment.   Wear thin, stretchable stockings over the wrap to hold it in place. This keeps the wrap from sticking to your sheets while sleeping. It also keeps your clothing from sticking to the wrap.   Wear shoes that can fit comfortably around a wrap that covers your leg and foot.     Nutrition and blood sugar control:  Focus on the following:  Protein: Meats, beans, eggs, milk and yogurt particularly Greek yogurt), tofu, soy nuts, soy protein products (Follow the protein handout in your welcome folder)  Vitamin C: Citrus fruits and juices, strawberries, tomatoes, tomato juice, peppers, baked potatoes, spinach, broccoli, cauliflower, [place] sprouts, cabbage  Vitamin A: Dark green, leafy vegetables, orange or yellow vegetables, cantaloupe, fortified dairy products, liver, fortified cereals  Zinc: Fortified cereals, red meats, seafood  Consider supplementing with Efrain by [employer]. It can be purchased on amazon, [employer] website, or local pharmacy may be able to order it for you.  (These are essential branch chain amino acids that help with tissue building and wound healing).     Concerns:  Signs of infection may include the following:  Increase in redness  Red \"streaks\" from wound  Increase in swelling  Fever  Unusual odor  Change in the amount of wound drainage     Should you experience any significant changes in your wound(s) or have any questions regarding your home care instructions please contact the wound center [facility] @ [phone] If after regular business hours, please call your family doctor or local emergency room. The treatment plan has been discussed at length between you and your provider. Follow all instructions carefully, it is very important. If you do not follow all instructions you are at risk of your wound not healing, infection, possible loss of limb and even loss of life.

## 2024-12-18 NOTE — PROGRESS NOTES
CHIEF COMPLAINT:     Chief Complaint   Patient presents with    Wound Recheck     Pt arrives for wound care follow-up appointment with his right leg in unna calamine compression. He brings personal compression to his appointment today. Reports slight itching at the top of his wrap, but otherwise no concerns.     HPI:   Information obtained from patient and chart  12-3-24 INITIAL:  patient is a 35 yo male with pmhx of adhd, htn and obesity.  In August he presented to urgent care with an area of cellulitis on his right lower leg. He states that he is certain it was a bug bite as he was outside, felt it and then the area started to swell.  At urgent care it was not clear if it was true cellulitis or a reaction to an ointment he had been using on the area.  He was treated with clindamycin followed by a week of bactrim.. when he followed up with pcp recently for his yearly physical it was noted that the eschar was still present and patient was referred to wound clinic. Noted during visits in the summer and recently patient with elevated bp-untreated at this time. We discussed monitoring at home and keeping a log and risks of consistently high bp to other organ systems.  He has been treating the area with an ointment from [facility] that his parents recommended.  Today there is not any s/s of infection. 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 He does sleep in a bed and denies any other medical hx.  No c/o pain. We discussed compression, patient is agreeable to treatment plan.    12-10-24 patient returns. He did get a home bp machine and he states that he has been monitoring at home and his systolic is 150-170 diastolics around 100. We discussed that he needs to f/u with primary because he will likely need bp med. He is tolerating the compression.  Wound is smaller and without s/s of infection. We also discussed compression for post healing. Will order 2 layer stockings. I will update primary    12-17-24  patient returns. He is not able to get in to his primary until jan 13.  His bp at home has been 150's/100's,  he states he cooks at home for part of the week and then will eat out the other part of the week, he does salt his food, he does not exercise, other than for walking around his work.  He did get his compression stockings they are xl (which his measurements are on the high side), will see if we can get an xxl pair. Patient states he is an anxious person in general.  I asked him to send his primary a message with his bp averages. We also discussed lifestyle modifications that help with bp (salt intake, exercise, sleep etc) and I gave him a handout.  His wound is fragilly healed.  We will wrap for another week and plan to transition to his compression stockings next week. No c/o pain.    MEDICATIONS:   No current outpatient medications on file.  ALLERGIES:   Allergies[1]   REVIEW OF SYSTEMS:   This information was obtained from the patient/family and chart.    See HPI for pertinent positives, otherwise 10 pt ROS negative.  HISTORY:   Past medical, surgical, family and social history updated where appropriate.      PHYSICAL EXAM:     Vitals:    12/18/24 1323 12/18/24 1351   BP: (!) 153/107  Comment: denies symptoms of HTN - has been monitoring at home and working up with PCP (!) 160/92  Comment: manual reading   Pulse: 90    Resp: 16    Temp: 97.2 °F (36.2 °C)        Estimated body mass index is 41.61 kg/m² as calculated from the following:    Height as of 12/3/24: 70\".    Weight as of 12/3/24: 290 lb (131.5 kg).   No results found for: \"PGLU\"    Vital signs reviewed.Appears stated age, well groomed.    Constitutional:  Bp elevated, recheck improved, patient denies symptoms, will continue to monitor and f/u with pcp, discussed lifestyle modifications. Pulse Regular and wnl for patient. Respirations easy and unlabored. Temperature wnl. obese. Appearance neat and clean. Appears in no acute distress. Well nourished  and well developed.    Lower extremity:  dp/pt palpable right.  right lower extremities free of varicosities, +edema with slight increase. Capillary refill < 3 seconds. Digits are warm. toenails are wnl for color, thickness and hygeine. Skin hydration wnl. + hairgrowth on legs, feet and toes.  Musculoskeletal:  Gait and station stable   Integumentary:  refer to wound characteristics and images   Psychiatric:  Judgment and insight intact. Alert and oriented times 3. No evidence of depression, anxiety, or agitation. Calm, cooperative, and communicative.   EDEMA:   Calf     Point of Measurement - Right Calf: 37        Right Calf from:: Heel  Right Calf cm:: 47.8  Ankle     Point of Measurement - Right Ankle: 13           Right Ankle from:: Heel  Right Ankle cm:: 27.4     DIAGNOSTICS:     Lab Results   Component Value Date    BUN 15 11/29/2024    CREATSERUM 1.26 11/29/2024    ALB 5.2 (H) 11/29/2024    TP 8.3 (H) 11/29/2024       WOUND ASSESSMENT:     Wound 12/03/24 #1 Leg Right;Medial (Active)   Date First Assessed/Time First Assessed: 12/03/24 0916    Wound Number (Wound Clinic Only): #1  Primary Wound Type: Venous Ulcer  Location: Leg  Wound Location Orientation: Right;Medial      Assessments 12/3/2024  9:18 AM 12/18/2024  1:18 PM   Wound Image         Drainage Amount Unable to assess Scant   Drainage Description -- Serosanguineous   Treatments Compression (Unna boot 30-40mmHg, spandagrip E) --   Wound Length (cm) 0.6 cm 0.1 cm   Wound Width (cm) 0.6 cm 0.1 cm   Wound Surface Area (cm^2) 0.36 cm^2 0.01 cm^2   Wound Depth (cm) 0.1 cm 0 cm   Wound Volume (cm^3) 0.036 cm^3 0 cm^3   Wound Healing % -- 100   Margins Well-defined edges Well-defined edges   Non-staged Wound Description -- Full thickness   Leigh Ann-wound Assessment Blanchable erythema;Edema Edema   Wound Granulation Tissue Red;Firm Pink;Firm   Wound Bed Granulation (%) 100 % 40 %   Wound Bed Epithelium (%) -- 20 %   Wound Odor -- None       No associated orders.        Compression Wrap 12/03/24 Leg Anterior;Right (Active)   Placement Date/Time: 12/03/24 1216   Location: Leg  Wound Location Orientation: Anterior;Right      Assessments 12/3/2024  9:18 AM 12/10/2024  4:12 PM   Response to Treatment Well tolerated Well tolerated   Compression Layers Multilayer Multilayer   Compression Product Type Unna Boot;Tubigrip/Spanda (Unna boot 30-40mmHg) Unna Boot (Unna boot 30-40mmHg)   Dressing Applied Yes (Charline, xeroform) Yes (Charline, xeroform, ABD pad)   Compression Wrap Location Toes to Knee Toes to Knee   Compression Wrap Status Clean;Dry;Intact Clean;Dry;Intact       No associated orders.              ASSESSMENT AND PLAN:    1. Venous hypertension, chronic, with ulcer and inflammation, right (HCC)    2. Ulcer of right pretibial region with fat layer exposed (HCC)    3. Primary hypertension            Risks, benefits, and alternatives of current treatment plan discussed in detail.  Questions and concerns addressed. Red flags to RTC or ED reviewed.  Patient (or parent) agrees to plan.      NOTE TO PATIENT: The 21st Century Cures Act makes clinical notes like these available to patients in the interest of transparency. Clinical notes are medical documents used by physicians and care providers to communicate with each other. These documents include medical language and terminology, abbreviations, and treatment information that may sound technical and at times possibly unfamiliar. In addition, at times, the verbiage may appear blunt or direct. These documents are one tool providers use to communicate relevant information and clinical opinions of the care providers in a way that allows common understanding of the clinical context.    I spent 40 minutes with the patient. This time included:    preparing to see the patient (eg, review notes and recent diagnostics),  seeing the patient, obtaining and/or reviewing separately obtained history, performing a medically appropriate examination  and/or evaluation, counseling and educating the patient, documenting in the record, discussion of bp  DISCHARGE:      Patient Instructions   Please return:   1 week with Vilma     Try to decrease salt intake this week      Patient discharge and wound care instructions  James Cooper  12/18/2024          You may shower with protection of the wound (ie a cast cover or similar).     Cleansing/dressing:     In clinic/, with each dressing change:    please cleanse the limb, foot, and between toes with soap, water and washcloth.   Dry thoroughly.    Cleanse/soak the wound with VASHE (or other hypochlorous wound cleanser), dab dry.    Apply the following dressings:     betamethasone to periwound  Open xeroform>abd pad    Compression:    30-40mmhg calamine     Managing your edema:  Avoid prolonged standing in one place. It is better to have your calf muscles moving to pump fluid out of the legs      Elevate leg(s) above the level of the heart when sitting or as much as possible.  Take you diuretics as directed by your physician. Do not skip doses or change doses unless instructed to do so by your physician.  Decrease salt intake, follow recommended 2 grams daily.  Do not get leg(s) with compression wrap wet. If wraps are too tight as indicated by pain, numbness/tingling or discoloration of toes remove wrap completely and call the wound center @ [phone].  Refer to the \"Multi-layer compression bandage application patient information sheet\" given to you in clinic.    What Are Compression Wraps?   Compression wraps are elastic bandages that wrap around a part of your body to keep swelling down. The wraps create pressure which pushes extra fluid out of a certain area. This improves blood flow to that part of the body and helps it heal faster.   Compression wraps come in different styles. Your doctor will recommend the best compression wrap for your needs.  How Do I Take Care of a Compression Wrap?   Compression  wraps can be worn for up to seven days if you take good care of them. Here's how to make them last and keep them working right:   Keep them clean and dry until your next doctor's appointment.   Wear thin, stretchable stockings over the wrap to hold it in place. This keeps the wrap from sticking to your sheets while sleeping. It also keeps your clothing from sticking to the wrap.   Wear shoes that can fit comfortably around a wrap that covers your leg and foot.     Nutrition and blood sugar control:  Focus on the following:  Protein: Meats, beans, eggs, milk and yogurt particularly Greek yogurt), tofu, soy nuts, soy protein products (Follow the protein handout in your welcome folder)  Vitamin C: Citrus fruits and juices, strawberries, tomatoes, tomato juice, peppers, baked potatoes, spinach, broccoli, cauliflower, [place] sprouts, cabbage  Vitamin A: Dark green, leafy vegetables, orange or yellow vegetables, cantaloupe, fortified dairy products, liver, fortified cereals  Zinc: Fortified cereals, red meats, seafood  Consider supplementing with Efrain by [employer]. It can be purchased on amazon, [employer] website, or local pharmacy may be able to order it for you.  (These are essential branch chain amino acids that help with tissue building and wound healing).     Concerns:  Signs of infection may include the following:  Increase in redness  Red \"streaks\" from wound  Increase in swelling  Fever  Unusual odor  Change in the amount of wound drainage     Should you experience any significant changes in your wound(s) or have any questions regarding your home care instructions please contact the [facility] @ [phone] If after regular business hours, please call your family doctor or local emergency room. The treatment plan has been discussed at length between you and your provider. Follow all instructions carefully, it is very important. If you do not follow all instructions you are at risk of your  wound not healing, infection, possible loss of limb and even loss of life.       Vilma Means FNP-C, CWCN-AP, CFCN, CSWS, WCC, DWC  12/18/2024            [1] No Known Allergies

## 2024-12-18 NOTE — PROGRESS NOTES
.Weekly Wound Education Note    Teaching Provided To: Patient  Training Topics: Discharge instructions;Dressing;Edema control;Compression;Nutrition  Training Method: Explain/Verbal;Written  Training Response: Patient responds and understands            Patient given low salt dietary handouts, instructed to report the weeks blood pressure readings to PCP.    Betamethasone to leg, folded xeroform to wound.  Calamine unna boot 30-40mmHg.  Will order XXL Mediven stockings, patient does have his XL new stockings.  Instructed to bring to next visit.

## 2024-12-25 NOTE — PROGRESS NOTES
CHIEF COMPLAINT:     Chief Complaint   Patient presents with    Wound Care     Patient arrives with an unna wrap to the right leg. Patient arrives for a wound care follow up appointment and reports no pain.      HPI:   Information obtained from patient and chart  12-3-24 INITIAL:  patient is a 33 yo male with pmhx of adhd, htn and obesity.  In August he presented to urgent care with an area of cellulitis on his right lower leg. He states that he is certain it was a bug bite as he was outside, felt it and then the area started to swell.  At urgent care it was not clear if it was true cellulitis or a reaction to an ointment he had been using on the area.  He was treated with clindamycin followed by a week of bactrim.. when he followed up with pcp recently for his yearly physical it was noted that the eschar was still present and patient was referred to wound clinic. Noted during visits in the summer and recently patient with elevated bp-untreated at this time. We discussed monitoring at home and keeping a log and risks of consistently high bp to other organ systems.  He has been treating the area with an ointment from [facility] that his parents recommended.  Today there is not any s/s of infection. 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 He does sleep in a bed and denies any other medical hx.  No c/o pain. We discussed compression, patient is agreeable to treatment plan.    12-10-24 patient returns. He did get a home bp machine and he states that he has been monitoring at home and his systolic is 150-170 diastolics around 100. We discussed that he needs to f/u with primary because he will likely need bp med. He is tolerating the compression.  Wound is smaller and without s/s of infection. We also discussed compression for post healing. Will order 2 layer stockings. I will update primary    12-18-24 patient returns. He is not able to get in to his primary until jan 13.  His bp at home has been  150's/100's,  he states he cooks at home for part of the week and then will eat out the other part of the week, he does salt his food, he does not exercise, other than for walking around his work.  He did get his compression stockings they are xl (which his measurements are on the high side), will see if we can get an xxl pair. Patient states he is an anxious person in general.  I asked him to send his primary a message with his bp averages. We also discussed lifestyle modifications that help with bp (salt intake, exercise, sleep etc) and I gave him a handout.  His wound is fragilly healed.  We will wrap for another week and plan to transition to his compression stockings next week. No c/o pain.    12-26-24 patient returns. His bp has continued to be elevated, he has been watching his salt intake. He feels more anxious today after being with his brother over the holiday.  Last appointment the wound was very fragilly healed and plan was to transition him to his personal compression stockings today. Wound remains healed. We discussed that his bp needs to be treated.  I offered to rx diuretic or clonidine (which would help with his anxiety as well).  Patient states he will contact/reach out to primary today with his bp readings over the last 1.5 weeks.  I asked him to please contact me via [employer] if for some reason he does not get a response. We discussed risks of htn including kidney, heart damage.     MEDICATIONS:   No current outpatient medications on file.  ALLERGIES:   Allergies[1]   REVIEW OF SYSTEMS:   This information was obtained from the patient/family and chart.    See HPI for pertinent positives, otherwise 10 pt ROS negative.  HISTORY:   Past medical, surgical, family and social history updated where appropriate.      PHYSICAL EXAM:     Vitals:    12/26/24 1500 12/26/24 1619   BP: (!) 155/108  Comment: pt runs high, denies s/s of HBP (!) 155/95   Pulse: 89 89   Resp: 17 16   Temp: 97.7 °F (36.5 °C) 98.3 °F  (36.8 °C)         Estimated body mass index is 41.61 kg/m² as calculated from the following:    Height as of 12/3/24: 70\".    Weight as of 12/3/24: 290 lb (131.5 kg).   No results found for: \"PGLU\"    Vital signs reviewed.Appears stated age, well groomed.    Constitutional:  Bp elevated, recheck slightly improved, patient denies symptoms, see hpi for discussion/plan. Pulse Regular and wnl for patient. Respirations easy and unlabored. Temperature wnl. obese. Appearance neat and clean. Appears in no acute distress. Well nourished and well developed.    Lower extremity:  dp/pt palpable right.  right lower extremities free of varicosities, +edema is stable. Capillary refill < 3 seconds. Digits are warm. toenails are wnl for color, thickness and hygeine. Skin hydration wnl. + hairgrowth on legs, feet and toes.  Musculoskeletal:  Gait and station stable   Integumentary:  refer to wound characteristics and images   Psychiatric:  Judgment and insight intact. Alert and oriented times 3. No evidence of depression, anxiety, or agitation. Calm, cooperative, and communicative.   EDEMA:   Calf     Point of Measurement - Right Calf: 37        Right Calf from:: Heel  Right Calf cm:: 47.7  Ankle     Point of Measurement - Right Ankle: 13           Right Ankle from:: Heel  Right Ankle cm:: 26.5     DIAGNOSTICS:     Lab Results   Component Value Date    BUN 15 11/29/2024    CREATSERUM 1.26 11/29/2024    ALB 5.2 (H) 11/29/2024    TP 8.3 (H) 11/29/2024       WOUND ASSESSMENT:     Wound 12/03/24 #1 Leg Right;Medial (Active)   Date First Assessed/Time First Assessed: 12/03/24 0916    Wound Number (Wound Clinic Only): #1  Primary Wound Type: Venous Ulcer  Location: Leg  Wound Location Orientation: Right;Medial      Assessments 12/3/2024  9:18 AM 12/26/2024  4:02 PM   Wound Image        Drainage Amount Unable to assess None   Treatments Compression (Unna boot 30-40mmHg, spandagrip E) --   Wound Length (cm) 0.6 cm 0.1 cm   Wound Width (cm)  0.6 cm 0.1 cm   Wound Surface Area (cm^2) 0.36 cm^2 0.01 cm^2   Wound Depth (cm) 0.1 cm 0.1 cm   Wound Volume (cm^3) 0.036 cm^3 0.001 cm^3   Wound Healing % -- 97   Margins Well-defined edges Well-defined edges   Non-staged Wound Description -- Full thickness   Leigh Ann-wound Assessment Blanchable erythema;Edema Clean   Wound Granulation Tissue Red;Firm Pink;Firm   Wound Bed Granulation (%) 100 % 100 %   Wound Odor -- None   Tunneling? -- No   Undermining? -- No   Sinus Tracts? -- No       No associated orders.       Compression Wrap 12/03/24 Leg Anterior;Right (Active)   Placement Date/Time: 12/03/24 1216   Location: Leg  Wound Location Orientation: Anterior;Right      Assessments 12/3/2024  9:18 AM 12/18/2024  2:02 PM   Response to Treatment Well tolerated Well tolerated   Compression Layers Multilayer Multilayer   Compression Product Type Unna Boot;Tubigrip/Spanda (Unna boot 30-40mmHg) Unna Boot (calamine unna boot 30-40mmHg)   Dressing Applied Yes (Charline, xeroform) Yes (xeroform)   Compression Wrap Location Toes to Knee Toes to Knee   Compression Wrap Status Clean;Dry;Intact Clean;Dry       No associated orders.              ASSESSMENT AND PLAN:    1. Venous hypertension, chronic, with ulcer and inflammation, right (HCC)    2. Ulcer of right pretibial region with fat layer exposed (HCC)    3. Primary hypertension              Risks, benefits, and alternatives of current treatment plan discussed in detail.  Questions and concerns addressed. Red flags to RTC or ED reviewed.  Patient (or parent) agrees to plan.      NOTE TO PATIENT: The 21st Century Cures Act makes clinical notes like these available to patients in the interest of transparency. Clinical notes are medical documents used by physicians and care providers to communicate with each other. These documents include medical language and terminology, abbreviations, and treatment information that may sound technical and at times possibly unfamiliar. In addition,  at times, the verbiage may appear blunt or direct. These documents are one tool providers use to communicate relevant information and clinical opinions of the care providers in a way that allows common understanding of the clinical context.    I spent 30 minutes with the patient. This time included:    preparing to see the patient (eg, review notes and recent diagnostics),  seeing the patient, obtaining and/or reviewing separately obtained history, performing a medically appropriate examination and/or evaluation, counseling and educating the patient, documenting in the record,  DISCHARGE:      Patient Instructions   Patient discharge and wound care instructions  James Cooper  12/26/2024   Discharge from clinic    Please send your blood pressure readings to dr. Kumra today.    Wear compression whenever legs are dependent, may take them off at night.    Return to clinic as needed   Vilma Means FNP-C, CWCN-AP, CFCN, CSWS, WCC, DWC  12/26/2024            [1] No Known Allergies

## 2024-12-26 ENCOUNTER — APPOINTMENT (OUTPATIENT)
Dept: WOUND CARE | Facility: HOSPITAL | Age: 34
End: 2024-12-26
Attending: NURSE PRACTITIONER
Payer: COMMERCIAL

## 2024-12-26 VITALS
SYSTOLIC BLOOD PRESSURE: 155 MMHG | DIASTOLIC BLOOD PRESSURE: 95 MMHG | RESPIRATION RATE: 16 BRPM | HEART RATE: 89 BPM | TEMPERATURE: 98 F

## 2024-12-26 DIAGNOSIS — I10 PRIMARY HYPERTENSION: ICD-10-CM

## 2024-12-26 DIAGNOSIS — I87.331 VENOUS HYPERTENSION, CHRONIC, WITH ULCER AND INFLAMMATION, RIGHT (HCC): Primary | ICD-10-CM

## 2024-12-26 DIAGNOSIS — L97.812 ULCER OF RIGHT PRETIBIAL REGION WITH FAT LAYER EXPOSED (HCC): ICD-10-CM

## 2024-12-26 PROCEDURE — 99214 OFFICE O/P EST MOD 30 MIN: CPT | Performed by: NURSE PRACTITIONER

## 2024-12-26 NOTE — PROGRESS NOTES
.Weekly Wound Education Note    Teaching Provided To: Patient  Training Topics: Edema control;Compression;Discharge instructions  Training Method: Explain/Verbal;Written  Training Response: Patient responds and understands        Notes: Per provider, wound is healed. Pt transitioned into 2 layer compression stockings. Pt instructed to message his PCP regarding blood pressure readings. Pt discharged from clinic at this time, pt instructed to call clinic if wound reopens or has issues.

## 2024-12-26 NOTE — PATIENT INSTRUCTIONS
Patient discharge and wound care instructions  James Cooper  12/26/2024   Discharge from clinic    Please send your blood pressure readings to dr. Kumar today.    Wear compression whenever legs are dependent, may take them off at night.    Return to clinic as needed

## 2025-01-13 ENCOUNTER — OFFICE VISIT (OUTPATIENT)
Dept: FAMILY MEDICINE CLINIC | Facility: CLINIC | Age: 35
End: 2025-01-13
Payer: COMMERCIAL

## 2025-01-13 VITALS
HEIGHT: 70 IN | SYSTOLIC BLOOD PRESSURE: 148 MMHG | OXYGEN SATURATION: 98 % | RESPIRATION RATE: 16 BRPM | DIASTOLIC BLOOD PRESSURE: 84 MMHG | WEIGHT: 303 LBS | BODY MASS INDEX: 43.38 KG/M2 | HEART RATE: 113 BPM

## 2025-01-13 DIAGNOSIS — I10 PRIMARY HYPERTENSION: Primary | ICD-10-CM

## 2025-01-13 RX ORDER — LOSARTAN POTASSIUM 50 MG/1
50 TABLET ORAL DAILY
Qty: 30 TABLET | Refills: 1 | Status: SHIPPED | OUTPATIENT
Start: 2025-01-13

## 2025-01-13 NOTE — PROGRESS NOTES
[facility] Progress Note    SUBJECTIVE: James Cooper 34 year old male is here today for   Chief Complaint   Patient presents with    Blood Pressure     Elevated blood pressure, has not been on BP meds previously. Lab f/u       Blood pressure unger been elevated.    One of his goals this year is to lose weight.    Was going to use [employer], which is a food delivery and has a calorie control option.          PMH  Past Medical History:    Attention deficit disorder (ADD) without hyperactivity    Hypertension    Obesity    Other general medical examination for administrative purposes        PSH  History reviewed. No pertinent surgical history.     Social Hx:  No changes    ROS  See HPI    OBJECTIVE:  /84   Pulse 113   Resp 16   Ht 5' 10\" (1.778 m)   Wt (!) 303 lb (137.4 kg)   SpO2 98%   BMI 43.48 kg/m²     CV: RRR, s1 and s2 present, no murmurs clicks or rubs      Labs:          Meds:   Current Outpatient Medications   Medication Sig Dispense Refill    losartan 50 MG Oral Tab Take 1 tablet (50 mg total) by mouth daily. 30 tablet 1         Assessment/Plan  James was seen today for blood pressure.    Diagnoses and all orders for this visit:    Primary hypertension  -     losartan 50 MG Oral Tab; Take 1 tablet (50 mg total) by mouth daily.  -     Comp Metabolic Panel (14) [E]; Future         Will start losartan and follow up Bps to see if improving, and can adjust therapy. Labs for follow up ordered.             Total Time spent with patient and coordinating care:  25 minutes.    Follow up: as needed      José Pfeiffer MD

## 2025-03-28 DIAGNOSIS — I10 PRIMARY HYPERTENSION: ICD-10-CM

## 2025-03-28 RX ORDER — LOSARTAN POTASSIUM 50 MG/1
50 TABLET ORAL DAILY
Qty: 30 TABLET | Refills: 1 | Status: SHIPPED | OUTPATIENT
Start: 2025-03-28

## 2025-07-01 DIAGNOSIS — I10 PRIMARY HYPERTENSION: ICD-10-CM

## 2025-07-02 RX ORDER — LOSARTAN POTASSIUM 50 MG/1
50 TABLET ORAL DAILY
Qty: 30 TABLET | Refills: 1 | Status: SHIPPED | OUTPATIENT
Start: 2025-07-02

## 2025-07-02 NOTE — TELEPHONE ENCOUNTER
A refill request was received for:  Requested Prescriptions     Pending Prescriptions Disp Refills    LOSARTAN 50 MG Oral Tab [Pharmacy Med Name: LOSARTAN 50MG TABLETS] 30 tablet 1     Sig: TAKE 1 TABLET(50 MG) BY MOUTH DAILY       Last refill date:   3/28/25    Last office visit: 1/13/25    Follow up due:  No future appointments.

## (undated) NOTE — LETTER
Date: 12/10/2024  Patient name: James Cooper  YOB: 1990  Medical Record Number: [id number]  Primary Coverage: Payor: [facility] PPO / Plan: [facility] PPO / Product Type: PPO /   Secondary Coverage:   Insurance ID: [id number]  Patient Address: [address] [place] [address], [address]  [facility] [address]  Telephone Information:   Home Phone [phone]   Mobile [phone]         Encounter Date: 12/10/2024  Provider: GERRY Rivera  Diagnosis:     ICD-10-CM   1. Venous hypertension, chronic, with ulcer and inflammation, right ([facility])  I87.331   2. Ulcer of right pretibial region with fat layer exposed ([facility])  L97.812   3. Primary hypertension  I10       Progress Note:  CHIEF COMPLAINT:     Chief Complaint   Patient presents with    Wound Care     Patient is here for a follow up visit for a wound to the right leg.     HPI:   Information obtained from patient and chart  12-3-24 INITIAL:  patient is a 33 yo male with pmhx of adhd, htn and obesity.  In August he presented to urgent care with an area of cellulitis on his right lower leg. He states that he is certain it was a bug bite as he was outside, felt it and then the area started to swell.  At urgent care it was not clear if it was true cellulitis or a reaction to an ointment he had been using on the area.  He was treated with clindamycin followed by a week of bactrim.. when he followed up with pcp recently for his yearly physical it was noted that the eschar was still present and patient was referred to wound clinic. Noted during visits in the summer and recently patient with elevated bp-untreated at this time. We discussed monitoring at home and keeping a log and risks of consistently high bp to other organ systems.  He has been treating the area with an ointment from [facility]iel that his parents recommended.  Today there is not any s/s of infection. 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 He does sleep in a bed and denies any other  medical hx.  No c/o pain. We discussed compression, patient is agreeable to treatment plan.    12-10-24 patient returns. He did get a home bp machine and he states that he has been monitoring at home and his systolic is 150-170 diastolics around 100. We discussed that he needs to f/u with primary because he will likely need bp med. He is tolerating the compression.  Wound is smaller and without s/s of infection. We also discussed compression for post healing. Will order 2 layer stockings. I will update primary    MEDICATIONS:   No current outpatient medications on file.  ALLERGIES:   Allergies[1]   REVIEW OF SYSTEMS:   This information was obtained from the patient/family and chart.    See HPI for pertinent positives, otherwise 10 pt ROS negative.  HISTORY:   Past medical, surgical, family and social history updated where appropriate.      PHYSICAL EXAM:     Vitals:    12/10/24 1614   BP: (!) 153/101   Pulse: 114   Resp: 14   Temp: 97.7 °F (36.5 °C)        Estimated body mass index is 41.61 kg/m² as calculated from the following:    Height as of 12/3/24: 70\".    Weight as of 12/3/24: 290 lb (131.5 kg).   No results found for: \"PGLU\"    Vital signs reviewed.Appears stated age, well groomed.    Constitutional:  Bp is elevated, patient denies symptoms, will continue to monitor and f/u with pcp. Pulse Regular and wnl for patient. Respirations easy and unlabored. Temperature wnl. obese. Appearance neat and clean. Appears in no acute distress. Well nourished and well developed.    Lower extremity:  dp/pt palpable right.  right lower extremities free of varicosities, +edema stable. Capillary refill < 3 seconds. Digits are warm. toenails are wnl for color, thickness and hygeine. Skin hydration wnl. + hairgrowth on legs, feet and toes.  Musculoskeletal:  Gait and station stable   Integumentary:  refer to wound characteristics and images   Psychiatric:  Judgment and insight intact. Alert and oriented times 3. No evidence of  depression, anxiety, or agitation. Calm, cooperative, and communicative.   EDEMA:   Calf     Point of Measurement - Right Calf: 37           Right Calf cm:: 46  Ankle     Point of Measurement - Right Ankle: 13              Right Ankle cm:: 26.9     DIAGNOSTICS:     Lab Results   Component Value Date    BUN 15 11/29/2024    CREATSERUM 1.26 11/29/2024    ALB 5.2 (H) 11/29/2024    TP 8.3 (H) 11/29/2024       WOUND ASSESSMENT:     Wound 12/03/24 #1 Leg Right;Medial (Active)   Date First Assessed/Time First Assessed: 12/03/24 0916    Wound Number (Wound Clinic Only): #1  Primary Wound Type: Venous Ulcer  Location: Leg  Wound Location Orientation: Right;Medial      Assessments 12/3/2024  9:18 AM 12/10/2024  4:12 PM   Wound Image        Drainage Amount Unable to assess Scant   Drainage Description -- Serosanguineous   Treatments Compression (Unna boot 30-40mmHg, spandagrip E) --   Wound Length (cm) 0.6 cm 0.3 cm   Wound Width (cm) 0.6 cm 0.3 cm   Wound Surface Area (cm^2) 0.36 cm^2 0.09 cm^2   Wound Depth (cm) 0.1 cm 0.1 cm   Wound Volume (cm^3) 0.036 cm^3 0.009 cm^3   Wound Healing % -- 75   Margins Well-defined edges Well-defined edges   Non-staged Wound Description -- Full thickness   Leigh Ann-wound Assessment Blanchable erythema;Edema Edema   Wound Granulation Tissue Red;Firm Pink;Spongy   Wound Bed Granulation (%) 100 % 100 %   Wound Odor -- None   Tunneling? -- No   Undermining? -- No   Sinus Tracts? -- No       No associated orders.       Compression Wrap 12/03/24 Leg Anterior;Right (Active)   Placement Date/Time: 12/03/24 1216   Location: Leg  Wound Location Orientation: Anterior;Right      Assessments 12/3/2024  9:18 AM 12/5/2024  4:17 PM   Response to Treatment Well tolerated Well tolerated   Compression Layers Multilayer Multilayer   Compression Product Type Unna Boot;Tubigrip/Spanda (Unna boot 30-40mmHg) Unna Boot;Tubigrip/Spanda (calamine unna boot 30-40mmHg)   Dressing Applied Yes (Charline, xeroform) Yes  (janelle,xeroform,ABD pad)   Compression Wrap Location Toes to Knee Toes to Knee   Compression Wrap Status Clean;Dry;Intact Clean;Dry;Intact       No associated orders.              ASSESSMENT AND PLAN:    1. Venous hypertension, chronic, with ulcer and inflammation, right ([facility])    2. Ulcer of right pretibial region with fat layer exposed ([facility])    3. Primary hypertension          Risks, benefits, and alternatives of current treatment plan discussed in detail.  Questions and concerns addressed. Red flags to RTC or ED reviewed.  Patient (or parent) agrees to plan.      NOTE TO PATIENT: The 21st Century Cures Act makes clinical notes like these available to patients in the interest of transparency. Clinical notes are medical documents used by physicians and care providers to communicate with each other. These documents include medical language and terminology, abbreviations, and treatment information that may sound technical and at times possibly unfamiliar. In addition, at times, the verbiage may appear blunt or direct. These documents are one tool providers use to communicate relevant information and clinical opinions of the care providers in a way that allows common understanding of the clinical context.    I spent 30 minutes with the patient. This time included:    preparing to see the patient (eg, review notes and recent diagnostics),  seeing the patient, obtaining and/or reviewing separately obtained history, performing a medically appropriate examination and/or evaluation, counseling and educating the patient, documenting in the record, communication with pcp  DISCHARGE:      Patient Instructions   Please return:   1 week with Vilma    Make an appointment with dr. Kumar for your blood pressure, bring your log on your phone    We will order compression stockings, bring them to next appointment    Patient discharge and wound care instructions  James Cooper  12/10/2024       You may shower with protection  of the wound (ie a cast cover or similar).     Cleansing/dressing:     In clinic/, with each dressing change:    please cleanse the limb, foot, and between toes with soap, water and washcloth.   Dry thoroughly.    Cleanse/soak the wound with VASHE (or other hypochlorous wound cleanser), dab dry.    Apply the following dressings:     betamethasone to periwound  Charline>xeroform>abd pad    Compression:    30-40mmhg calamine     Managing your edema:  Avoid prolonged standing in one place. It is better to have your calf muscles moving to pump fluid out of the legs      Elevate leg(s) above the level of the heart when sitting or as much as possible.  Take you diuretics as directed by your physician. Do not skip doses or change doses unless instructed to do so by your physician.  Decrease salt intake, follow recommended 2 grams daily.  Do not get leg(s) with compression wrap wet. If wraps are too tight as indicated by pain, numbness/tingling or discoloration of toes remove wrap completely and call the wound center @ [phone].  Refer to the \"Multi-layer compression bandage application patient information sheet\" given to you in clinic.    What Are Compression Wraps?   Compression wraps are elastic bandages that wrap around a part of your body to keep swelling down. The wraps create pressure which pushes extra fluid out of a certain area. This improves blood flow to that part of the body and helps it heal faster.   Compression wraps come in different styles. Your doctor will recommend the best compression wrap for your needs.  How Do I Take Care of a Compression Wrap?   Compression wraps can be worn for up to seven days if you take good care of them. Here's how to make them last and keep them working right:   Keep them clean and dry until your next doctor's appointment.   Wear thin, stretchable stockings over the wrap to hold it in place. This keeps the wrap from sticking to your sheets while sleeping. It also keeps your  clothing from sticking to the wrap.   Wear shoes that can fit comfortably around a wrap that covers your leg and foot.     Nutrition and blood sugar control:  Focus on the following:  Protein: Meats, beans, eggs, milk and yogurt particularly Greek yogurt), tofu, soy nuts, soy protein products (Follow the protein handout in your welcome folder)  Vitamin C: Citrus fruits and juices, strawberries, tomatoes, tomato juice, peppers, baked potatoes, spinach, broccoli, cauliflower, [place] sprouts, cabbage  Vitamin A: Dark green, leafy vegetables, orange or yellow vegetables, cantaloupe, fortified dairy products, liver, fortified cereals  Zinc: Fortified cereals, red meats, seafood  Consider supplementing with Efrain by [employer]. It can be purchased on amazon, [employer] website, or local pharmacy may be able to order it for you.  (These are essential branch chain amino acids that help with tissue building and wound healing).     Concerns:  Signs of infection may include the following:  Increase in redness  Red \"streaks\" from wound  Increase in swelling  Fever  Unusual odor  Change in the amount of wound drainage     Should you experience any significant changes in your wound(s) or have any questions regarding your home care instructions please contact the [facility] @ [phone] If after regular business hours, please call your family doctor or local emergency room. The treatment plan has been discussed at length between you and your provider. Follow all instructions carefully, it is very important. If you do not follow all instructions you are at risk of your wound not healing, infection, possible loss of limb and even loss of life.       Vilma Means FNP-C, CWCN-AP, CFCN, CSWS, WCC, DWC  12/10/2024            [1] No Known Allergies      .Weekly Wound Education Note    Teaching Provided To: Patient  Training Topics: Dressing;Edema control;Cleasing and general instructions;Compression;Discharge  instructions  Training Method: Explain/Verbal;Written  Training Response: Patient responds and understands        Notes: Wound improving. Continue janelle, folded xeroform and calamine unna boot 30-40mmHg with folded ABD pad between layers over wound area. Will order compression stocking this visit.        Wound Treatment Orders:  No orders of the defined types were placed in this encounter.        Wound Information/Order:  Wound Number: All wounds  Product:Other Mediven Dual layer compression stocking size X-Large  Dispense: 90 days  Dressing Frequency:Change dressing daily and/or PRN    Was a Debridement performed: Yes, Debridement type: mechanical    Compression Stockings ordered: Yes   Product type: Other Mediven Dual Layer compression stocking, size X-Large  Frequency: daily  Duration: 90 days      Calf     Point of Measurement - Right Calf: 37           Right Calf cm:: 46    Ankle     Point of Measurement - Right Ankle: 13              Right Ankle cm:: 26.9       Notes: Dispense as written    Additional wound: No

## (undated) NOTE — LETTER
Date: 12/18/2024  Patient name: James Cooper  YOB: 1990  Medical Record Number: [id number]  Primary Coverage: Payor: [facility] PPO / Plan: [facility] PPO / Product Type: PPO /   Secondary Coverage:   Insurance ID: [id number]  Patient Address: [address] [place] [address], [address]  [facility] [address]  Telephone Information:   Home Phone [phone]   Mobile [phone]         Encounter Date: 12/18/2024  Provider: GERRY Rivera  Diagnosis:     ICD-10-CM   1. Venous hypertension, chronic, with ulcer and inflammation, right (HCC)  I87.331   2. Ulcer of right pretibial region with fat layer exposed (HCC)  L97.812   3. Primary hypertension  I10       Progress Note:  CHIEF COMPLAINT:     Chief Complaint   Patient presents with    Wound Recheck     Pt arrives for wound care follow-up appointment with his right leg in unna calamine compression. He brings personal compression to his appointment today. Reports slight itching at the top of his wrap, but otherwise no concerns.     HPI:   Information obtained from patient and chart  12-3-24 INITIAL:  patient is a 35 yo male with pmhx of adhd, htn and obesity.  In August he presented to urgent care with an area of cellulitis on his right lower leg. He states that he is certain it was a bug bite as he was outside, felt it and then the area started to swell.  At urgent care it was not clear if it was true cellulitis or a reaction to an ointment he had been using on the area.  He was treated with clindamycin followed by a week of bactrim.. when he followed up with pcp recently for his yearly physical it was noted that the eschar was still present and patient was referred to wound clinic. Noted during visits in the summer and recently patient with elevated bp-untreated at this time. We discussed monitoring at home and keeping a log and risks of consistently high bp to other organ systems.  He has been treating the area with an ointment from dariel that his  parents recommended.  Today there is not any s/s of infection. 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 He does sleep in a bed and denies any other medical hx.  No c/o pain. We discussed compression, patient is agreeable to treatment plan.    12-10-24 patient returns. He did get a home bp machine and he states that he has been monitoring at home and his systolic is 150-170 diastolics around 100. We discussed that he needs to f/u with primary because he will likely need bp med. He is tolerating the compression.  Wound is smaller and without s/s of infection. We also discussed compression for post healing. Will order 2 layer stockings. I will update primary    12-17-24 patient returns. He is not able to get in to his primary until jan 13.  His bp at home has been 150's/100's,  he states he cooks at home for part of the week and then will eat out the other part of the week, he does salt his food, he does not exercise, other than for walking around his work.  He did get his compression stockings they are xl (which his measurements are on the high side), will see if we can get an xxl pair. Patient states he is an anxious person in general.  I asked him to send his primary a message with his bp averages. We also discussed lifestyle modifications that help with bp (salt intake, exercise, sleep etc) and I gave him a handout.  His wound is fragilly healed.  We will wrap for another week and plan to transition to his compression stockings next week. No c/o pain.    MEDICATIONS:   No current outpatient medications on file.  ALLERGIES:   Allergies[1]   REVIEW OF SYSTEMS:   This information was obtained from the patient/family and chart.    See HPI for pertinent positives, otherwise 10 pt ROS negative.  HISTORY:   Past medical, surgical, family and social history updated where appropriate.      PHYSICAL EXAM:     Vitals:    12/18/24 1323 12/18/24 1351   BP: (!) 153/107  Comment: denies symptoms of HTN - has been  monitoring at home and working up with PCP (!) 160/92  Comment: manual reading   Pulse: 90    Resp: 16    Temp: 97.2 °F (36.2 °C)        Estimated body mass index is 41.61 kg/m² as calculated from the following:    Height as of 12/3/24: 70\".    Weight as of 12/3/24: 290 lb (131.5 kg).   No results found for: \"PGLU\"    Vital signs reviewed.Appears stated age, well groomed.    Constitutional:  Bp elevated, recheck improved, patient denies symptoms, will continue to monitor and f/u with pcp, discussed lifestyle modifications. Pulse Regular and wnl for patient. Respirations easy and unlabored. Temperature wnl. obese. Appearance neat and clean. Appears in no acute distress. Well nourished and well developed.    Lower extremity:  dp/pt palpable right.  right lower extremities free of varicosities, +edema with slight increase. Capillary refill < 3 seconds. Digits are warm. toenails are wnl for color, thickness and hygeine. Skin hydration wnl. + hairgrowth on legs, feet and toes.  Musculoskeletal:  Gait and station stable   Integumentary:  refer to wound characteristics and images   Psychiatric:  Judgment and insight intact. Alert and oriented times 3. No evidence of depression, anxiety, or agitation. Calm, cooperative, and communicative.   EDEMA:   Calf     Point of Measurement - Right Calf: 37        Right Calf from:: Heel  Right Calf cm:: 47.8  Ankle     Point of Measurement - Right Ankle: 13           Right Ankle from:: Heel  Right Ankle cm:: 27.4     DIAGNOSTICS:     Lab Results   Component Value Date    BUN 15 11/29/2024    CREATSERUM 1.26 11/29/2024    ALB 5.2 (H) 11/29/2024    TP 8.3 (H) 11/29/2024       WOUND ASSESSMENT:     Wound 12/03/24 #1 Leg Right;Medial (Active)   Date First Assessed/Time First Assessed: 12/03/24 0916    Wound Number (Wound Clinic Only): #1  Primary Wound Type: Venous Ulcer  Location: Leg  Wound Location Orientation: Right;Medial      Assessments 12/3/2024  9:18 AM 12/18/2024  1:18 PM   Wound  Image         Drainage Amount Unable to assess Scant   Drainage Description -- Serosanguineous   Treatments Compression (Unna boot 30-40mmHg, spandagrip E) --   Wound Length (cm) 0.6 cm 0.1 cm   Wound Width (cm) 0.6 cm 0.1 cm   Wound Surface Area (cm^2) 0.36 cm^2 0.01 cm^2   Wound Depth (cm) 0.1 cm 0 cm   Wound Volume (cm^3) 0.036 cm^3 0 cm^3   Wound Healing % -- 100   Margins Well-defined edges Well-defined edges   Non-staged Wound Description -- Full thickness   Leigh Ann-wound Assessment Blanchable erythema;Edema Edema   Wound Granulation Tissue Red;Firm Pink;Firm   Wound Bed Granulation (%) 100 % 40 %   Wound Bed Epithelium (%) -- 20 %   Wound Odor -- None       No associated orders.       Compression Wrap 12/03/24 Leg Anterior;Right (Active)   Placement Date/Time: 12/03/24 1216   Location: Leg  Wound Location Orientation: Anterior;Right      Assessments 12/3/2024  9:18 AM 12/10/2024  4:12 PM   Response to Treatment Well tolerated Well tolerated   Compression Layers Multilayer Multilayer   Compression Product Type Unna Boot;Tubigrip/Spanda (Unna boot 30-40mmHg) Unna Boot (Unna boot 30-40mmHg)   Dressing Applied Yes (Charline, xeroform) Yes (Charline, xeroform, ABD pad)   Compression Wrap Location Toes to Knee Toes to Knee   Compression Wrap Status Clean;Dry;Intact Clean;Dry;Intact       No associated orders.              ASSESSMENT AND PLAN:    1. Venous hypertension, chronic, with ulcer and inflammation, right (HCC)    2. Ulcer of right pretibial region with fat layer exposed (HCC)    3. Primary hypertension            Risks, benefits, and alternatives of current treatment plan discussed in detail.  Questions and concerns addressed. Red flags to RTC or ED reviewed.  Patient (or parent) agrees to plan.      NOTE TO PATIENT: The 21st Century Cures Act makes clinical notes like these available to patients in the interest of transparency. Clinical notes are medical documents used by physicians and care providers to  communicate with each other. These documents include medical language and terminology, abbreviations, and treatment information that may sound technical and at times possibly unfamiliar. In addition, at times, the verbiage may appear blunt or direct. These documents are one tool providers use to communicate relevant information and clinical opinions of the care providers in a way that allows common understanding of the clinical context.    I spent 40 minutes with the patient. This time included:    preparing to see the patient (eg, review notes and recent diagnostics),  seeing the patient, obtaining and/or reviewing separately obtained history, performing a medically appropriate examination and/or evaluation, counseling and educating the patient, documenting in the record, discussion of bp  DISCHARGE:      Patient Instructions   Please return:   1 week with Vilma     Try to decrease salt intake this week      Patient discharge and wound care instructions  James Cooper  12/18/2024          You may shower with protection of the wound (ie a cast cover or similar).     Cleansing/dressing:     In clinic/, with each dressing change:    please cleanse the limb, foot, and between toes with soap, water and washcloth.   Dry thoroughly.    Cleanse/soak the wound with VASHE (or other hypochlorous wound cleanser), dab dry.    Apply the following dressings:     betamethasone to periwound  Open xeroform>abd pad    Compression:    30-40mmhg calamine     Managing your edema:  Avoid prolonged standing in one place. It is better to have your calf muscles moving to pump fluid out of the legs      Elevate leg(s) above the level of the heart when sitting or as much as possible.  Take you diuretics as directed by your physician. Do not skip doses or change doses unless instructed to do so by your physician.  Decrease salt intake, follow recommended 2 grams daily.  Do not get leg(s) with compression wrap wet. If wraps are too  tight as indicated by pain, numbness/tingling or discoloration of toes remove wrap completely and call the wound center @ [phone].  Refer to the \"Multi-layer compression bandage application patient information sheet\" given to you in clinic.    What Are Compression Wraps?   Compression wraps are elastic bandages that wrap around a part of your body to keep swelling down. The wraps create pressure which pushes extra fluid out of a certain area. This improves blood flow to that part of the body and helps it heal faster.   Compression wraps come in different styles. Your doctor will recommend the best compression wrap for your needs.  How Do I Take Care of a Compression Wrap?   Compression wraps can be worn for up to seven days if you take good care of them. Here's how to make them last and keep them working right:   Keep them clean and dry until your next doctor's appointment.   Wear thin, stretchable stockings over the wrap to hold it in place. This keeps the wrap from sticking to your sheets while sleeping. It also keeps your clothing from sticking to the wrap.   Wear shoes that can fit comfortably around a wrap that covers your leg and foot.     Nutrition and blood sugar control:  Focus on the following:  Protein: Meats, beans, eggs, milk and yogurt particularly Greek yogurt), tofu, soy nuts, soy protein products (Follow the protein handout in your welcome folder)  Vitamin C: Citrus fruits and juices, strawberries, tomatoes, tomato juice, peppers, baked potatoes, spinach, broccoli, cauliflower, [place] sprouts, cabbage  Vitamin A: Dark green, leafy vegetables, orange or yellow vegetables, cantaloupe, fortified dairy products, liver, fortified cereals  Zinc: Fortified cereals, red meats, seafood  Consider supplementing with Efrain by Decisyon. It can be purchased on amazon, [employer] website, or local pharmacy may be able to order it for you.  (These are essential branch chain amino acids that help with tissue  building and wound healing).     Concerns:  Signs of infection may include the following:  Increase in redness  Red \"streaks\" from wound  Increase in swelling  Fever  Unusual odor  Change in the amount of wound drainage     Should you experience any significant changes in your wound(s) or have any questions regarding your home care instructions please contact the wound center [facility] @ [phone] If after regular business hours, please call your family doctor or local emergency room. The treatment plan has been discussed at length between you and your provider. Follow all instructions carefully, it is very important. If you do not follow all instructions you are at risk of your wound not healing, infection, possible loss of limb and even loss of life.       Vilma Means FNP-C, CWCN-AP, CFCN, CSWS, WCC, DWC  12/18/2024            [1] No Known Allergies      .Weekly Wound Education Note    Teaching Provided To: Patient  Training Topics: Discharge instructions;Dressing;Edema control;Compression;Nutrition  Training Method: Explain/Verbal;Written  Training Response: Patient responds and understands            Patient given low salt dietary handouts, instructed to report the weeks blood pressure readings to PCP.    Betamethasone to leg, folded xeroform to wound.  Calamine unna boot 30-40mmHg.  Will order XXL Mediven stockings, patient does have his XL new stockings.  Instructed to bring to next visit.    Wound Treatment Orders:  No orders of the defined types were placed in this encounter.          Wound Information/Order:  Product:Other Mediven Dual Layer compression stockings 30-40mmHg size XXL.  Dispense: 30 days  Dressing Frequency:Change dressing daily and/or PRN    Was a Debridement performed: Yes, Debridement type: mechanical    Compression Stockings ordered: Yes   Product type: Other   Other Mediven Dual Layer compression stockings 30-40mmHg size XXL.  Frequency: daily      Calf     Point of  Measurement - Right Calf: 37        Right Calf from:: Heel  Right Calf cm:: 47.8    Ankle     Point of Measurement - Right Ankle: 13           Right Ankle from:: Heel  Right Ankle cm:: 27.4                        Notes: Mediven Dual Layer compression stockings 30-40mmHg size XXL.

## (undated) NOTE — Clinical Note
Rusty Ramirez was seen today in the walk in clinic for cellulitis.  His blood pressure was elevated and he was instructed to follow up with your office in the next few weeks. Have a wonderful day! Thanks, Radha